# Patient Record
Sex: FEMALE | Race: WHITE | NOT HISPANIC OR LATINO | Employment: OTHER | URBAN - METROPOLITAN AREA
[De-identification: names, ages, dates, MRNs, and addresses within clinical notes are randomized per-mention and may not be internally consistent; named-entity substitution may affect disease eponyms.]

---

## 2017-01-18 ENCOUNTER — CARE COORDINATION (OUTPATIENT)
Dept: CARE COORDINATION | Facility: CLINIC | Age: 72
End: 2017-01-18

## 2017-01-18 NOTE — PROGRESS NOTES
Clinic Care Coordination Contact  [facility]/Voicemail    Referral Source: PCP  Clinical Data: Care Coordinator Outreach  Outreach attempted x 1.  Unable to leave pt a voicemail message due to not having a voicemail box set-up.     Plan:  Care Coordinator will try to reach patient again in 10-14 business days.    MICHELLE Lowry, [facility]  Social Work - Care Coordinator  [facility]- [place] and [place]  Phone: [phone]

## 2017-02-03 ENCOUNTER — CARE COORDINATION (OUTPATIENT)
Dept: CARE COORDINATION | Facility: CLINIC | Age: 72
End: 2017-02-03

## 2017-02-03 NOTE — PROGRESS NOTES
Clinic Care Coordination Contact  [facility]/Voicemail    Referral Source: PCP  Clinical Data: Care Coordinator Outreach  Outreach attempted x 2.  SW was not able to leave pt a voicemail message due to her voicemail box not being set up. SW attempted to reach pt at a later time in the afternoon without luck.    Plan: Care Coordinator will try to reach patient again in one month.    MICHELLE Lowry, SW  Social Work - Care Coordinator  [facility]- [place], [place], and [place]  Phone: [phone]

## 2017-03-03 ENCOUNTER — CARE COORDINATION (OUTPATIENT)
Dept: CARE COORDINATION | Facility: CLINIC | Age: 72
End: 2017-03-03

## 2017-03-03 NOTE — LETTER
[facility]      [address] [place] [address]        March 3, 2017      Mariah [place]  [address]   [facility] [address]    Dear Mariah,  I am the Clinic Care Coordinator that works with your primary care provider's clinic. I recently tried to call and was unable to reach you. Below is a description of what Clinic Care Coordination is and how I can further assist you.     The Clinic Care Coordinator role is a Registered Nurse and/or  who understands the health care system. The goal of Clinic Care Coordination is to help you manage your health and improve access to the [facility] in the most efficient manner.  The Registered Nurse can assist you in meeting your health care goals by providing education, coordinating services, and strengthening the communication among your providers. The  can assist you with financial, behavioral, psychosocial, and chemical dependency and counseling/psychiatric resources.    Please feel free to keep this letter and contact information to contact me at [phone] with any further questions or concerns that may arise. We at [facility] are focused on providing you with the highest-quality healthcare experience possible and that all starts with you.       Sincerely,     Colleen Chow

## 2017-03-03 NOTE — PROGRESS NOTES
Clinic Care Coordination Contact  [facility]/Voicemail    Referral Source: PCP  Clinical Data: Care Coordinator Outreach  Outreach attempted x 3.  Unable to leave a voicemail message as pt does not have it set-up.     Plan: Care Coordinator mailed out care coordination introduction letter on 03/03/17. Care Coordinator will try to reach patient again in one month.    MICHELLE Lowry, [facility]  Social Work - Care Coordinator  [facility]- [place], [place], and [place]  Phone: [phone]

## 2017-04-03 ENCOUNTER — CARE COORDINATION (OUTPATIENT)
Dept: CARE COORDINATION | Facility: CLINIC | Age: 72
End: 2017-04-03

## 2017-04-03 NOTE — PROGRESS NOTES
Clinic Care Coordination Contact  [facility]/Voicemail    Referral Source: PCP  Clinical Data: Care Coordinator Outreach  Outreach attempted x 4.  SW is unable to leave a voicemail message as pt's phone cut off before the options was available.    Plan: Care Coordinator mailed out care coordination introduction letter on 03/03/17. Care Coordinator will try to reach patient again in one month.    MICHELLE Lowry, LGSW  Social Work - Care Coordinator  [facility]- [place], [place], and [place]  Phone: [phone]

## 2017-05-03 ENCOUNTER — CARE COORDINATION (OUTPATIENT)
Dept: CARE COORDINATION | Facility: CLINIC | Age: 72
End: 2017-05-03

## 2017-05-03 NOTE — PROGRESS NOTES
Clinic Care Coordination Contact  [facility]/Voicemail    Referral Source: PCP  Clinical Data: Care Coordinator Outreach  Outreach attempted x 5.  SW was not able to leave a voicemail message for pt due to the voicemail box not being sent up.    Plan: Care Coordinator mailed out care coordination introduction letter on 03/03/17. Care Coordinator will try to reach patient again in one month.    MICHELLE Lowry, SW  Social Work - Care Coordinator  [facility]- [place], [place], and [place]  Phone: [phone]

## 2017-06-05 ENCOUNTER — CARE COORDINATION (OUTPATIENT)
Dept: CARE COORDINATION | Facility: CLINIC | Age: 72
End: 2017-06-05

## 2017-06-05 NOTE — PROGRESS NOTES
Clinic Care Coordination Contact  [email or web address]Voicemail    Referral Source: PCP  Clinical Data: Care Coordinator Outreach  Outreach attempted x 6.  SW was not able to leave a voicemail message as pt does not have a message set-up.    Plan: Care Coordinator mailed out care coordination introduction letter on 03/03/17. Care Coordinator will try to reach patient again in one month. Please alert SW if pt schedules an appointment.     MICHELLE Lowry, [facility]  Social Work - Care Coordinator  [facility]- [place], [place], and [place]  Phone: [phone]

## 2017-07-03 ENCOUNTER — CARE COORDINATION (OUTPATIENT)
Dept: CARE COORDINATION | Facility: CLINIC | Age: 72
End: 2017-07-03

## 2017-07-03 NOTE — PROGRESS NOTES
Clinic Care Coordination Contact  [facility]/Voicemail    Referral Source: PCP  Clinical Data: Care Coordinator Outreach  Outreach attempted x 7.  SW is not able to leave a voicemail message as pt does not have a voicemail box set-up.    Plan: Care Coordinator mailed out care coordination introduction letter on 03/03/17. Care Coordinator will do no further outreaches at this time. Pt has SW contact information for immediate concerns. SW left a note for staff to update SW when pt schedules an appointment with PCP.    MICHELLE Lowry, [facility]  Social Work - Care Coordinator  [facility]- [place], [place], and [place]  Phone: [phone]

## 2017-09-27 ENCOUNTER — DOCUMENTATION ONLY (OUTPATIENT)
Dept: FAMILY MEDICINE | Facility: CLINIC | Age: 72
End: 2017-09-27

## 2017-10-27 ENCOUNTER — TELEPHONE (OUTPATIENT)
Dept: FAMILY MEDICINE | Facility: CLINIC | Age: 72
End: 2017-10-27

## 2017-10-27 NOTE — TELEPHONE ENCOUNTER
I tried to call patient and no answer.  Could you please let sister know this?    Please forward to Colleen our care coordinator and we can discuss what social service options might be available to assist in this situation.  Unfortunately, if pt is not in danger it may be difficult to assist in this situation.    Thanks.    Rossi

## 2017-10-27 NOTE — TELEPHONE ENCOUNTER
Patient sister Letty is calling, concerned that the patient may have dementia.  She is very forgetful, feels she should be seen.   She has been unable to get her to come in to the clinic with her for anything at all.  She also has had some stomach issues lately and not been feeling well, like her heart is racing at times.  She just refuses any appointments. Can we possibly do anything to encourage  Her to come in? Could you call her?   She went off of her anti-depressant as well, and has not been seen   Here in over a year. Many care coordination phone calls where she did not answer.  And were unable to leave a message.  Is there anything they can do to help her?    No consent to communicate on file, sister is her emergency contact.  Dinora Heaton, RN  Triage Nurse

## 2017-10-27 NOTE — TELEPHONE ENCOUNTER
Called sister Letty back to let her know. She would like to talk to care coordination for some help with this.  Dinora Heaton, RN  Triage Nurse

## 2018-02-16 ENCOUNTER — TELEPHONE (OUTPATIENT)
Dept: FAMILY MEDICINE | Facility: CLINIC | Age: 73
End: 2018-02-16

## 2018-02-16 NOTE — TELEPHONE ENCOUNTER
Patient's sister Letty called stating alzheimers symptoms worse.     Patient is neglecting personal cares and pet cat. There are bags of garbage in the house. Patient will not go to see pcp.    Sister is requesting resources to help with this situation.    Message being routed to Colleen AKINS    Please contact sister Letty to discuss resources.    Thank you.    Anita Mcknight RN

## 2018-02-16 NOTE — TELEPHONE ENCOUNTER
SWS unable to discuss resources with sister without signed release. Sister informed and given alternate number per SWS.    Anita Mcknight RN

## 2018-03-02 ENCOUNTER — OFFICE VISIT (OUTPATIENT)
Dept: FAMILY MEDICINE | Facility: CLINIC | Age: 73
End: 2018-03-02
Payer: MEDICARE

## 2018-03-02 ENCOUNTER — CARE COORDINATION (OUTPATIENT)
Dept: CARE COORDINATION | Facility: CLINIC | Age: 73
End: 2018-03-02

## 2018-03-02 VITALS
BODY MASS INDEX: 15.73 KG/M2 | TEMPERATURE: 98.4 F | WEIGHT: 92.1 LBS | HEART RATE: 62 BPM | OXYGEN SATURATION: 100 % | SYSTOLIC BLOOD PRESSURE: 114 MMHG | DIASTOLIC BLOOD PRESSURE: 74 MMHG | RESPIRATION RATE: 18 BRPM | HEIGHT: 64 IN

## 2018-03-02 DIAGNOSIS — F32.5 MAJOR DEPRESSION IN COMPLETE REMISSION (H): ICD-10-CM

## 2018-03-02 DIAGNOSIS — F41.9 ANXIETY: Primary | ICD-10-CM

## 2018-03-02 DIAGNOSIS — R41.3 MEMORY LOSS: ICD-10-CM

## 2018-03-02 DIAGNOSIS — E46 PROTEIN-CALORIE MALNUTRITION, UNSPECIFIED SEVERITY (H): ICD-10-CM

## 2018-03-02 PROCEDURE — 99214 OFFICE O/P EST MOD 30 MIN: CPT | Performed by: NURSE PRACTITIONER

## 2018-03-02 RX ORDER — FLUOXETINE 10 MG/1
10 CAPSULE ORAL DAILY
Qty: 30 CAPSULE | Refills: 0 | Status: SHIPPED | OUTPATIENT
Start: 2018-03-02 | End: 2018-03-09

## 2018-03-02 ASSESSMENT — ANXIETY QUESTIONNAIRES
3. WORRYING TOO MUCH ABOUT DIFFERENT THINGS: SEVERAL DAYS
1. FEELING NERVOUS, ANXIOUS, OR ON EDGE: NOT AT ALL
7. FEELING AFRAID AS IF SOMETHING AWFUL MIGHT HAPPEN: SEVERAL DAYS
5. BEING SO RESTLESS THAT IT IS HARD TO SIT STILL: NOT AT ALL
6. BECOMING EASILY ANNOYED OR IRRITABLE: SEVERAL DAYS
2. NOT BEING ABLE TO STOP OR CONTROL WORRYING: SEVERAL DAYS
GAD7 TOTAL SCORE: 4

## 2018-03-02 ASSESSMENT — PATIENT HEALTH QUESTIONNAIRE - PHQ9: 5. POOR APPETITE OR OVEREATING: NOT AT ALL

## 2018-03-02 NOTE — MR AVS SNAPSHOT
After Visit Summary   3/2/2018    Mariah [place]    MRN: [id number]           Patient Information     Date Of Birth          1945        Visit Information        Provider Department      3/2/2018 3:00 PM Agnes Montejo APRN CNP [facility]        Today's Diagnoses     Anxiety    -  1    Memory loss        Protein-calorie malnutrition, unspecified severity (H)        Major depression in complete remission (H)          Care Instructions    Meet with care coordinator, then blood work.     Begin use of fluoxetine one pill per day in the morning.     Return to clinic in 1 week for physical.          Follow-ups after your visit        Follow-up notes from your care team     Return in about 1 week (around 3/9/2018).      Your next 10 appointments already scheduled     Mar 09, 2018  3:00 PM CST   PHYSICAL with DASIA Mccurdy CNP   [facility] ([facility]    [address] [address] [phone]              Who to contact     If you have questions or need follow up information about today's clinic visit or your schedule please contact [facility] directly at [phone].  Normal or non-critical lab and imaging results will be communicated to you by MyChart, letter or phone within 4 business days after the clinic has received the results. If you do not hear from us within 7 days, please contact the clinic through [employer] or phone. If you have a critical or abnormal lab result, we will notify you by phone as soon as possible.  Submit refill requests through [employer] or call your pharmacy and they will forward the refill request to us. Please allow 3 business days for your refill to be completed.          Additional Information About Your Visit        MyChart Information     [employer] lets you send messages to your doctor, view your test results, renew your prescriptions, schedule appointments and more. To sign  "up, go to [email or web address] . Click on \"Log in\" on the left side of the screen, which will take you to the Welcome page. Then click on \"Sign up Now\" on the right side of the page.     You will be asked to enter the access code listed below, as well as some personal information. Please follow the directions to create your username and password.     Your access code is: [email or web address]  Expires: 2018  7:39 PM     Your access code will  in 90 days. If you need help or a new code, please call your [place] clinic or [phone].        Care EveryWhere ID     This is your Care EveryWhere ID. This could be used by other organizations to access your [place] medical records  [id number]        Your Vitals Were     Pulse Temperature Respirations Height Pulse Oximetry BMI (Body Mass Index)    62 98.4  F (36.9  C) (Oral) 18 5' 3.75\" (1.619 m) 100% 15.93 kg/m2       Blood Pressure from Last 3 Encounters:   18 114/74   10/28/16 115/70   16 96/62    Weight from Last 3 Encounters:   18 92 lb 1.6 oz (41.8 kg)   10/28/16 98 lb 14.4 oz (44.9 kg)   16 102 lb 3.2 oz (46.4 kg)                 Today's Medication Changes          These changes are accurate as of 3/2/18 11:59 PM.  If you have any questions, ask your nurse or doctor.               Start taking these medicines.        Dose/Directions    FLUoxetine 10 MG capsule   Commonly known as:  PROzac   Used for:  Anxiety   Started by:  Agnes Montejo APRN CNP        Dose:  10 mg   Take 1 capsule (10 mg) by mouth daily   Quantity:  30 capsule   Refills:  0            Where to get your medicines      These medications were sent to [facility] - [address]  [address] [address]     Phone:  [phone]     FLUoxetine 10 MG capsule                Primary Care Provider Office Phone # Fax #    DASIA Mccurdy [phone]       [facility] [address] [facility] [address]        Equal Access to Services     [facility] VANESSA : Hadii alfredo huynh hadceejanina Soingrisali, waaxda luqadaha, qaybta kaalmamalinda guerra, yessenia anguianomayitobenny regalado. So [facility] [phone].    ATENCIÓN: Si habla español, tiene a carcamo disposición servicios gratuitos de asistencia lingüística. Llame al [phone].    We comply with applicable federal civil rights laws and [place] laws. We do not discriminate on the basis of race, color, national origin, age, disability, sex, sexual orientation, or gender identity.            Thank you!     Thank you for choosing [facility]  for your care. Our goal is always to provide you with excellent care. Hearing back from our patients is one way we can continue to improve our services. Please take a few minutes to complete the written survey that you may receive in the mail after your visit with us. Thank you!             Your Updated Medication List - Protect others around you: Learn how to safely use, store and throw away your medicines at www.disposemymeds.org.          This list is accurate as of 3/2/18 11:59 PM.  Always use your most recent med list.                   Brand Name Dispense Instructions for use Diagnosis    FLUoxetine 10 MG capsule    PROzac    30 capsule    Take 1 capsule (10 mg) by mouth daily    Anxiety       levalbuterol 45 MCG/ACT Inhaler    XOPENEX HFA    1 Inhaler    Inhale 2 puffs into the lungs every 6 hours as needed for shortness of breath / dyspnea or wheezing    Wheezing

## 2018-03-02 NOTE — PROGRESS NOTES
"Clinic Care Coordination Contact      Referral Source: PCP  Clinical Data: Care Coordinator Outreach  Spoke to PCP who stated that pt's sister (Carlos: prefers to be called Shawna) presented to the clinic visit with pt. PCP stated that Shawna would to look into options for memory care. SW went to the room that pt and Shawna were in at the [facility]. Both had their coats on stating that they needed to leave. SW stated that pt needs to stop at the lab and get her blood drawn. Shawna stated that she needed to feed pt and that they could not do lab work \"she is too weak to do it today\". Shawna stated that they would come back next to complete the lab work. SW stated that pt should have the lab work completed prior to her visit on 03/09/18. [place] stated that she did not have time to discuss concerns with SW \"our ride is going to leave us - we have to go\". SW asked if pt could sign a consent to communicate form. Shawna stated that they \"would do it next week\". SW explained that the form would allow SW to talk to her about pt's needs. Shawna stated that they \"would just do it next week\".SW updated PCP on the visit with pt and Shawna.    Plan: Care Coordinator mailed out care coordination introduction letter on 03/03/17 (almost a year ago). SW had been following pt in the past for needs such as transportation. Care Coordinator will meet with pt at her next clinic visit.    MICHELLE Lowry, [facility]  Social Work - Care Coordinator  [facility] [place], [place], and [place]  Phone: [phone]      "

## 2018-03-02 NOTE — PATIENT INSTRUCTIONS
Meet with care coordinator, then blood work.     Begin use of fluoxetine one pill per day in the morning.     Return to clinic in 1 week for physical.

## 2018-03-02 NOTE — PROGRESS NOTES
SUBJECTIVE:   Mariah [place] is a 72 year old female who presents to clinic today for the following health issues:    Evaluation for Alzheimer's:   Patient's sister states she has been forgetting things like dates, time, and money. Reports Mariah stopped taking Paxil 6 months ago and has been more forgetful since; would like her to restart use of Paxil. Adds Mariah is living in an apartment building on her own. She is concerned about Mariah's nutrition as she does all of her cooking and grocery shopping on her own. Also notes Mariah used to love to watch certain shows on TV, read, write poetry, and sketch, but no longer enjoys any of the above.    Patient unable to draw a clock sufficiently to tell time, unable to remember 3 items on mini cog.  Patient and her sister here are anxious about any issues related to a change in her housing. They are not ready to do many changes at this time.  Not certain about having lab draw today.  Asked if we could have a consult with care coordination, and pt and her sister agreed, but were not able to complete conversation with care coordinator as they were anxious.    Tobacco  Allergies  Meds  Med Hx  Surg Hx  Fam Hx  Soc Hx            Patient Active Problem List   Diagnosis     COPD (chronic obstructive pulmonary disease) (H)     Hyperlipidemia LDL goal <130     Major depression in complete remission (H)     Tooth loss     Protein-calorie malnutrition (H)     Past Surgical History:   Procedure Laterality Date     APPENDECTOMY  1982     OOPHORECTOMY Left 1982       Social History   Substance Use Topics     Smoking status: Current Every Day Smoker     Packs/day: 0.50     Years: 35.00     Smokeless tobacco: Never Used     Alcohol use Yes      Comment: 2 times per week, 2 beers per time     Family History   Problem Relation Age of Onset     DIABETES Maternal Aunt      Hypertension Sister      Myocardial Infarction Maternal Uncle      Myocardial Infarction Paternal Uncle       "HEART DISEASE Sister 62     Stent placed           ROS:  CONSTITUTIONAL: NEGATIVE for fever, chills, remains with low body weight which has been discussed multiple times--  PSYCHIATRIC: see HPI   Resp:  No sob  CV: no chest pain  GI: no bowel or bladder changes  Neuro:  No recent headache or head injury    This document serves as a record of the services and decisions personally performed and made by DASIA Mccurdy CNP. It was created on his/her behalf by Juan José Velez, a trained medical scribe. The creation of this document is based the provider's statements to the medical scribe.  Scribe Juan José Velez 3:19 PM, March 2, 2018  OBJECTIVE:                                                    /74 (BP Location: Right arm, Patient Position: Chair, Cuff Size: Adult Regular)  Pulse 62  Temp 98.4  F (36.9  C) (Oral)  Resp 18  Ht 5' 3.75\" (1.619 m)  Wt 92 lb 1.6 oz (41.8 kg)  SpO2 100%  BMI 15.93 kg/m2  Body mass index is 15.93 kg/(m^2).  GENERAL: appears anxious,  Poor eye contact.    PSYCH: mentation see screening for dementia.  Admits to symptoms of depression with withdrawal from previously pleasurable activities.     ASSESSMENT/PLAN:                                                    A/P:    ICD-10-CM    1. Anxiety F41.9 FLUoxetine (PROZAC) 10 MG capsule   2. Memory loss R41.3 Comprehensive metabolic panel     TSH with free T4 reflex     *UA reflex to Microscopic     CBC with platelets differential     Erythrocyte sedimentation rate auto     CANCELED: Comprehensive metabolic panel     CANCELED: TSH with free T4 reflex     CANCELED: *UA reflex to Microscopic     CANCELED: CBC with platelets differential     CANCELED: Erythrocyte sedimentation rate auto   3. Protein-calorie malnutrition, unspecified severity (H) E46    4. Major depression in complete remission (H) F32.5      Patient did very well on Paxil in the past, but I am concerned about her ability to take this consistently. Rx fluoxetine with " longer half life. Get labs today.  Has appointment for next week and can continue discussion about dementia and her needs for support.  Today have pt meet with care coordination. Met with care coordinator, but patient and her sister are suspicious of care coordination. Patient left clinic today before completing lab work.     Will continue to follow closely and work with family to consider how best to meet patients' needs.  Total visit time 30; over one half spent discussing current issues.      Patient Instructions   Meet with care coordinator, then blood work.     Begin use of fluoxetine one pill per day in the morning.     Return to clinic in 1 week for physical.    The information in this document, created by the medical scribe for me, accurately reflects the services I personally performed and the decisions made by me. I have reviewed and approved this document for accuracy prior to leaving the patient care area.  3:38 PM, 03/02/18    DASIA Mccurdy [facility]

## 2018-03-03 ASSESSMENT — ANXIETY QUESTIONNAIRES: GAD7 TOTAL SCORE: 4

## 2018-03-03 ASSESSMENT — PATIENT HEALTH QUESTIONNAIRE - PHQ9: SUM OF ALL RESPONSES TO PHQ QUESTIONS 1-9: 11

## 2018-03-04 PROBLEM — E46 PROTEIN-CALORIE MALNUTRITION (H): Status: ACTIVE | Noted: 2018-03-04

## 2018-03-06 PROBLEM — F41.9 ANXIETY: Status: ACTIVE | Noted: 2018-03-06

## 2018-03-09 ENCOUNTER — CARE COORDINATION (OUTPATIENT)
Dept: CARE COORDINATION | Facility: CLINIC | Age: 73
End: 2018-03-09

## 2018-03-09 ENCOUNTER — OFFICE VISIT (OUTPATIENT)
Dept: FAMILY MEDICINE | Facility: CLINIC | Age: 73
End: 2018-03-09
Payer: MEDICARE

## 2018-03-09 VITALS
SYSTOLIC BLOOD PRESSURE: 134 MMHG | HEART RATE: 69 BPM | DIASTOLIC BLOOD PRESSURE: 72 MMHG | TEMPERATURE: 98.3 F | WEIGHT: 95.9 LBS | HEIGHT: 64 IN | OXYGEN SATURATION: 100 % | BODY MASS INDEX: 16.37 KG/M2 | RESPIRATION RATE: 18 BRPM

## 2018-03-09 DIAGNOSIS — F41.9 ANXIETY: ICD-10-CM

## 2018-03-09 DIAGNOSIS — R41.3 MEMORY LOSS: ICD-10-CM

## 2018-03-09 DIAGNOSIS — R82.90 NONSPECIFIC FINDING ON EXAMINATION OF URINE: Primary | ICD-10-CM

## 2018-03-09 LAB
ALBUMIN UR-MCNC: NEGATIVE MG/DL
AMORPH CRY #/AREA URNS HPF: ABNORMAL /HPF
APPEARANCE UR: ABNORMAL
BACTERIA #/AREA URNS HPF: ABNORMAL /HPF
BASOPHILS # BLD AUTO: 0 10E9/L (ref 0–0.2)
BASOPHILS NFR BLD AUTO: 0.1 %
BILIRUB UR QL STRIP: NEGATIVE
COLOR UR AUTO: YELLOW
DIFFERENTIAL METHOD BLD: ABNORMAL
EOSINOPHIL # BLD AUTO: 0.1 10E9/L (ref 0–0.7)
EOSINOPHIL NFR BLD AUTO: 0.6 %
ERYTHROCYTE [DISTWIDTH] IN BLOOD BY AUTOMATED COUNT: 14 % (ref 10–15)
ERYTHROCYTE [SEDIMENTATION RATE] IN BLOOD BY WESTERGREN METHOD: 13 MM/H (ref 0–30)
GLUCOSE UR STRIP-MCNC: NEGATIVE MG/DL
HCT VFR BLD AUTO: 34.5 % (ref 35–47)
HGB BLD-MCNC: 11.6 G/DL (ref 11.7–15.7)
HGB UR QL STRIP: NEGATIVE
KETONES UR STRIP-MCNC: NEGATIVE MG/DL
LEUKOCYTE ESTERASE UR QL STRIP: ABNORMAL
LYMPHOCYTES # BLD AUTO: 2 10E9/L (ref 0.8–5.3)
LYMPHOCYTES NFR BLD AUTO: 25 %
MCH RBC QN AUTO: 33.7 PG (ref 26.5–33)
MCHC RBC AUTO-ENTMCNC: 33.6 G/DL (ref 31.5–36.5)
MCV RBC AUTO: 100 FL (ref 78–100)
MONOCYTES # BLD AUTO: 0.7 10E9/L (ref 0–1.3)
MONOCYTES NFR BLD AUTO: 8.5 %
MUCOUS THREADS #/AREA URNS LPF: PRESENT /LPF
NEUTROPHILS # BLD AUTO: 5.4 10E9/L (ref 1.6–8.3)
NEUTROPHILS NFR BLD AUTO: 65.8 %
NITRATE UR QL: NEGATIVE
NON-SQ EPI CELLS #/AREA URNS LPF: ABNORMAL /LPF
PH UR STRIP: 8.5 PH (ref 5–7)
PLATELET # BLD AUTO: 284 10E9/L (ref 150–450)
RBC # BLD AUTO: 3.44 10E12/L (ref 3.8–5.2)
RBC #/AREA URNS AUTO: ABNORMAL /HPF
SOURCE: ABNORMAL
SP GR UR STRIP: 1.01 (ref 1–1.03)
UROBILINOGEN UR STRIP-ACNC: 0.2 EU/DL (ref 0.2–1)
WBC # BLD AUTO: 8.2 10E9/L (ref 4–11)
WBC #/AREA URNS AUTO: ABNORMAL /HPF

## 2018-03-09 PROCEDURE — 99214 OFFICE O/P EST MOD 30 MIN: CPT | Performed by: NURSE PRACTITIONER

## 2018-03-09 PROCEDURE — 85025 COMPLETE CBC W/AUTO DIFF WBC: CPT | Performed by: NURSE PRACTITIONER

## 2018-03-09 PROCEDURE — 87086 URINE CULTURE/COLONY COUNT: CPT | Performed by: NURSE PRACTITIONER

## 2018-03-09 PROCEDURE — 80053 COMPREHEN METABOLIC PANEL: CPT | Performed by: NURSE PRACTITIONER

## 2018-03-09 PROCEDURE — 84443 ASSAY THYROID STIM HORMONE: CPT | Performed by: NURSE PRACTITIONER

## 2018-03-09 PROCEDURE — 36415 COLL VENOUS BLD VENIPUNCTURE: CPT | Performed by: NURSE PRACTITIONER

## 2018-03-09 PROCEDURE — 85652 RBC SED RATE AUTOMATED: CPT | Performed by: NURSE PRACTITIONER

## 2018-03-09 PROCEDURE — 81001 URINALYSIS AUTO W/SCOPE: CPT | Performed by: NURSE PRACTITIONER

## 2018-03-09 RX ORDER — PAROXETINE HYDROCHLORIDE HEMIHYDRATE 12.5 MG/1
12.5 TABLET, FILM COATED, EXTENDED RELEASE ORAL EVERY MORNING
Qty: 30 TABLET | Refills: 1 | Status: SHIPPED | OUTPATIENT
Start: 2018-03-09 | End: 2018-11-05

## 2018-03-09 ASSESSMENT — ACTIVITIES OF DAILY LIVING (ADL)
I_NEED_ASSISTANCE_FOR_THE_FOLLOWING_DAILY_ACTIVITIES:: LAUNDRY
CURRENT_FUNCTION: BATHING REQUIRES ASSISTANCE
CURRENT_FUNCTION: TELEPHONE REQUIRES ASSISTANCE
I_NEED_ASSISTANCE_FOR_THE_FOLLOWING_DAILY_ACTIVITIES:: MEDICATION ADMINISTRATION
CURRENT_FUNCTION: LAUNDRY REQUIRES ASSISTANCE
CURRENT_FUNCTION: MEDICATION ADMINISTRATION REQUIRES ASSISTANCE
CURRENT_FUNCTION: MONEY MANAGEMENT REQUIRES ASSISTANCE
CURRENT_FUNCTION: TRANSPORTATION REQUIRES ASSISTANCE
CURRENT_FUNCTION: SHOPPING REQUIRES ASSISTANCE
I_NEED_ASSISTANCE_FOR_THE_FOLLOWING_DAILY_ACTIVITIES:: TRANSPORTATION
CURRENT_FUNCTION: HOUSEWORK REQUIRES ASSISTANCE
I_NEED_ASSISTANCE_FOR_THE_FOLLOWING_DAILY_ACTIVITIES:: BATHING
I_NEED_ASSISTANCE_FOR_THE_FOLLOWING_DAILY_ACTIVITIES:: TELEPHONE USE
I_NEED_ASSISTANCE_FOR_THE_FOLLOWING_DAILY_ACTIVITIES:: MONEY MANAGEMENT
I_NEED_ASSISTANCE_FOR_THE_FOLLOWING_DAILY_ACTIVITIES:: PREPARING MEALS
I_NEED_ASSISTANCE_FOR_THE_FOLLOWING_DAILY_ACTIVITIES:: SHOPPING
I_NEED_ASSISTANCE_FOR_THE_FOLLOWING_DAILY_ACTIVITIES:: HOUSEWORK
CURRENT_FUNCTION: PREPARING MEALS REQUIRES ASSISTANCE

## 2018-03-09 NOTE — MR AVS SNAPSHOT
"              After Visit Summary   3/9/2018    Mariah Winslow    MRN: [id number]           Patient Information     Date Of Birth          1945        Visit Information        Provider Department      3/9/2018 3:00 PM Agnes Montejo APRN CNP [facility]        Today's Diagnoses     Nonspecific finding on examination of urine    -  1    Memory loss        Anxiety          Care Instructions    Begin use of iron supplements.     Begin use of Paxil.           Follow-ups after your visit        Follow-up notes from your care team     Return in about 3 weeks (around 3/30/2018).      Who to contact     If you have questions or need follow up information about today's clinic visit or your schedule please contact [facility] directly at [phone].  Normal or non-critical lab and imaging results will be communicated to you by Wandrianhart, letter or phone within 4 business days after the clinic has received the results. If you do not hear from us within 7 days, please contact the clinic through Wandrianhart or phone. If you have a critical or abnormal lab result, we will notify you by phone as soon as possible.  Submit refill requests through Fieldoo or call your pharmacy and they will forward the refill request to us. Please allow 3 business days for your refill to be completed.          Additional Information About Your Visit        WandrianharTickPick Information     Fieldoo lets you send messages to your doctor, view your test results, renew your prescriptions, schedule appointments and more. To sign up, go to [email or web address] . Click on \"Log in\" on the left side of the screen, which will take you to the Welcome page. Then click on \"Sign up Now\" on the right side of the page.     You will be asked to enter the access code listed below, as well as some personal information. Please follow the directions to create your username and password.     Your access code is: [email or web address]  Expires: " "2018  7:39 PM     Your access code will  in 90 days. If you need help or a new code, please call your [facility] clinic or [phone].        Care EveryWhere ID     This is your Care EveryWhere ID. This could be used by other organizations to access your [facility] medical records  [id number]        Your Vitals Were     Pulse Temperature Respirations Height Pulse Oximetry BMI (Body Mass Index)    69 98.3  F (36.8  C) 18 5' 3.75\" (1.619 m) 100% 16.59 kg/m2       Blood Pressure from Last 3 Encounters:   18 134/72   18 114/74   10/28/16 115/70    Weight from Last 3 Encounters:   18 95 lb 14.4 oz (43.5 kg)   18 92 lb 1.6 oz (41.8 kg)   10/28/16 98 lb 14.4 oz (44.9 kg)              We Performed the Following     *UA reflex to Microscopic     CBC with platelets differential     Comprehensive metabolic panel     Erythrocyte sedimentation rate auto     TSH with free T4 reflex     Urine Culture Aerobic Bacterial          Today's Medication Changes          These changes are accurate as of 3/9/18  3:39 PM.  If you have any questions, ask your nurse or doctor.               Start taking these medicines.        Dose/Directions    PARoxetine 12.5 MG 24 hr tablet   Commonly known as:  PAXIL-CR   Used for:  Anxiety   Started by:  Agnes Montejo APRN CNP        Dose:  12.5 mg   Take 1 tablet (12.5 mg) by mouth every morning   Quantity:  30 tablet   Refills:  1         Stop taking these medicines if you haven't already. Please contact your care team if you have questions.     FLUoxetine 10 MG capsule   Commonly known as:  PROzac   Stopped by:  Agnes Montejo APRN CNP                Where to get your medicines      These medications were sent to [facility] [place] - [address]  [address] [address] [address]     Phone:  [phone]     PARoxetine 12.5 MG 24 hr tablet                Primary Care Provider Office Phone # Fax #    DASIA Mccurdy CNP " [phone]       [facility] [address]  [facility] [address]        Equal Access to Services     BERNARDO MENDEZ : Hadii alfredo huynh hadceeo Soingrisali, waaxda luqadaha, qaybta kaalmada adeprernada, yessenia andersongerald fabricio. So [facility] [place] [phone].    ATENCIÓN: Si habla español, tiene a carcamo disposición servicios gratuitos de asistencia lingüística. Llame al [phone].    We comply with applicable federal civil rights laws and [place] laws. We do not discriminate on the basis of race, color, national origin, age, disability, sex, sexual orientation, or gender identity.            Thank you!     Thank you for choosing [facility]  for your care. Our goal is always to provide you with excellent care. Hearing back from our patients is one way we can continue to improve our services. Please take a few minutes to complete the written survey that you may receive in the mail after your visit with us. Thank you!             Your Updated Medication List - Protect others around you: Learn how to safely use, store and throw away your medicines at www.disposemymeds.org.          This list is accurate as of 3/9/18  3:39 PM.  Always use your most recent med list.                   Brand Name Dispense Instructions for use Diagnosis    levalbuterol 45 MCG/ACT Inhaler    XOPENEX HFA    1 Inhaler    Inhale 2 puffs into the lungs every 6 hours as needed for shortness of breath / dyspnea or wheezing    Wheezing       PARoxetine 12.5 MG 24 hr tablet    PAXIL-CR    30 tablet    Take 1 tablet (12.5 mg) by mouth every morning    Anxiety

## 2018-03-09 NOTE — PROGRESS NOTES
"   SUBJECTIVE:   CC: Mariah Winslow is an 72 year old woman who presents for preventive health visit.     Physical   Annual:     Getting at least 3 servings of Calcium per day::  NO    Bi-annual eye exam::  NO    Dental care twice a year::  NO    Sleep apnea or symptoms of sleep apnea::  None    Taking medications regularly::  No    Barriers to taking medications::  Problems remembering to take them    Ability to successfully perform activities of daily living: telephone requires assistance, transportation requires assistance, shopping requires assistance, preparing meals requires assistance, housework requires assistance, bathing requires assistance, laundry requires assistance, medication administration requires assistance and money management requires assistance  Home Safety:  No safety concerns identified      Patient's eye contact was poor. Patient was a poor historian. Her sister is here and is answering most of the questions for the patient. Patient got up in the middle of appointment and said it was time to go. Finally did agree to lung and heart exam. Was able to palpate abdomen and noticed no tenderness.    PCA: Sister would like to have patient qualified for PCA. Patient's niece is a PCA and someone Mariah would trust. Would be able to do grocery shopping and cleaning for her, which sister notes is necessary.     Left Sided Pain: Patient describes pain in her left side for the past couple months. When asked to characterize, she states \"it just hurts.\" Pain is not present in legs or arms, unclear where it is located. Some relief with Rolaids.     Anxiety: Patient's sister states the two of them did not want her to take Prozac and would like her to go back on Paxil.       Review of labs:  Not all labs back today but was able to review CBC and anemia.      Today's PHQ-2 Score:   PHQ-2 ( 1999 Pfizer) 3/9/2018   PHQ-2 Score Incomplete     Abuse: Current or Past(Physical, Sexual or Emotional)- No  Do you feel " "safe in your environment - Yes    Social History   Substance Use Topics     Smoking status: Current Every Day Smoker     Packs/day: 0.50     Years: 35.00     Smokeless tobacco: Never Used     Alcohol use Yes      Comment: 2 times per week, 2 beers per time     No flowsheet data found.    Reviewed orders with patient.  Reviewed health maintenance and updated orders accordingly - Yes  Labs reviewed in EPIC    Pertinent mammograms are reviewed under the imaging tab.    Tobacco  Allergies  Meds  Problems  Med Hx  Surg Hx  Fam Hx  Soc Hx        Allergies  Meds  Problems        Review of Systems  ROS:  Note that patient is a poor historian.  Last week's visit noted significant evidence for dementia.      CONSTITUTIONAL: NEGATIVE for fever, chills  EYES: NEGATIVE for vision changes   RESP: NEGATIVE for significant cough or SOB  : NEGATIVE for frequency, dysuria, or hematuria  MUSCULOSKELETAL: NEGATIVE for significant arthralgias        This document serves as a record of the services and decisions personally performed and made by DASIA Mccurdy CNP. It was created on his/her behalf by Juan José Velez, a trained medical scribe. The creation of this document is based the provider's statements to the medical scribe.  Scribaileen Velez 3:23 PM, March 9, 2018  OBJECTIVE:   /72 (BP Location: Right arm, Patient Position: Chair, Cuff Size: Adult Regular)  Pulse 69  Temp 98.3  F (36.8  C)  Resp 18  Ht 5' 3.75\" (1.619 m)  Wt 95 lb 14.4 oz (43.5 kg)  SpO2 100%  BMI 16.59 kg/m2  Physical Exam  GENERAL: healthy, alert and no distress  RESP: lungs clear to auscultation - no rales, rhonchi or wheezes  CV: regular rate and rhythm, normal S1 S2, no S3 or S4, no murmur, click or rub, no peripheral edema and peripheral pulses strong  ABDOMEN: soft, nontender, no hepatosplenomegaly, no masses    ASSESSMENT/PLAN:       ICD-10-CM    1. Nonspecific finding on examination of urine R82.90 Urine Culture Aerobic " "Bacterial     Urine Microscopic   2. Memory loss R41.3 Comprehensive metabolic panel     TSH with free T4 reflex     *UA reflex to Microscopic     CBC with platelets differential     Erythrocyte sedimentation rate auto   3. Anxiety F41.9 PARoxetine (PAXIL-CR) 12.5 MG 24 hr tablet     Patient agrees to start a multivitamin.  Patient agrees to take paxil.  Was afraid to start the fluoxetine.  Tried to Rx for long acting paroxetine but cost was issue.    Patient to follow up in 3 weeks.    Care coordination is on this case and is working on home evaluation at this time.    COUNSELING:      Mariah reports that she has been smoking.  She has a 17.50 pack-year smoking history. She has never used smokeless tobacco.  Estimated body mass index is 16.59 kg/(m^2) as calculated from the following:    Height as of this encounter: 5' 3.75\" (1.619 m).    Weight as of this encounter: 95 lb 14.4 oz (43.5 kg).   Weight management plan refer for home evaluation to care coordination + multivitamin    Counseling Resources:  ATP IV Guidelines  Pooled Cohorts Equation Calculator  Breast Cancer Risk Calculator  FRAX Risk Assessment  ICSI Preventive Guidelines  Dietary Guidelines for Americans, 2010  USDA's MyPlate  ASA Prophylaxis  Lung CA Screening    The information in this document, created by the medical scribe for me, accurately reflects the services I personally performed and the decisions made by me. I have reviewed and approved this document for accuracy prior to leaving the patient care area.  3:52 PM, 03/09/18    DASIA Mccurdy [facility] [place]  Answers for HPI/ROS submitted by the patient on 3/9/2018   PHQ-2 Score: Incomplete    "

## 2018-03-09 NOTE — PROGRESS NOTES
Clinic Care Coordination Contact  Voicemail    Referral Source: PCP  Clinical Data: Care Coordinator Outreach  SW spoke to PCP who stated that pt is looking to get PCA services. PCP stated that pt and sister left after a short visit with her. SW left a message for [facility] asking that they call SW to help get an assessment scheduled for pt.     Plan: Care Coordinator will try to reach patient again in 2 weeks.    MICHELLE Lowry, [facility]  Social Work - Care Coordinator  [facility]- [place], [place], and [place]  Phone: [phone]

## 2018-03-10 LAB
ALBUMIN SERPL-MCNC: 3.9 G/DL (ref 3.4–5)
ALP SERPL-CCNC: 62 U/L (ref 40–150)
ALT SERPL W P-5'-P-CCNC: 18 U/L (ref 0–50)
ANION GAP SERPL CALCULATED.3IONS-SCNC: 5 MMOL/L (ref 3–14)
AST SERPL W P-5'-P-CCNC: 25 U/L (ref 0–45)
BACTERIA SPEC CULT: NO GROWTH
BILIRUB SERPL-MCNC: 0.2 MG/DL (ref 0.2–1.3)
BUN SERPL-MCNC: 9 MG/DL (ref 7–30)
CALCIUM SERPL-MCNC: 8.7 MG/DL (ref 8.5–10.1)
CHLORIDE SERPL-SCNC: 100 MMOL/L (ref 94–109)
CO2 SERPL-SCNC: 31 MMOL/L (ref 20–32)
CREAT SERPL-MCNC: 0.6 MG/DL (ref 0.52–1.04)
GFR SERPL CREATININE-BSD FRML MDRD: >90 ML/MIN/1.7M2
GLUCOSE SERPL-MCNC: 80 MG/DL (ref 70–99)
POTASSIUM SERPL-SCNC: 4.2 MMOL/L (ref 3.4–5.3)
PROT SERPL-MCNC: 7.4 G/DL (ref 6.8–8.8)
SODIUM SERPL-SCNC: 136 MMOL/L (ref 133–144)
SPECIMEN SOURCE: NORMAL
TSH SERPL DL<=0.005 MIU/L-ACNC: 1.27 MU/L (ref 0.4–4)

## 2018-03-13 NOTE — PROGRESS NOTES
Clinic Care Coordination Contact      Referral Source: PCP  Clinical Data: Care Coordinator Outreach  Spoke to [facility] who stated that they would be happy to outreach to pt to start services. SW stated that the worker should call pt's sister (Shawna) to get more information about pt's situation and to set-up the assessment. Per worker, they will outreach to SW as needed.    Plan:  Care Coordinator will try to reach patient again in 1-2 weeks.    MICHELLE Lowry, [facility]  Social Work - Care Coordinator  [facility]- [place], [place], and [place]  Phone: [phone]

## 2018-03-22 ENCOUNTER — PATIENT OUTREACH (OUTPATIENT)
Dept: CARE COORDINATION | Facility: CLINIC | Age: 73
End: 2018-03-22

## 2018-03-22 NOTE — PROGRESS NOTES
Clinic Care Coordination Contact  [facility]/Voicemail    Referral Source: PCP  Clinical Data: Care Coordinator Outreach  Outreach attempted x 1.  Left message on voicemail for pt's sister (Shawna) with call back information and requested return call. SW had contacted [facility] asking for an assessment to determine if pt would be eligible for services.     Plan: Care Coordinator will try to reach patient again in 2 weeks.     MICHELLE Lowry, [facility]  Social Work - Care Coordinator  [facility]- [place], [place], and [place]  Phone: [phone]

## 2018-03-28 ENCOUNTER — TELEPHONE (OUTPATIENT)
Dept: FAMILY MEDICINE | Facility: CLINIC | Age: 73
End: 2018-03-28

## 2018-03-28 NOTE — TELEPHONE ENCOUNTER
Sister is calling to ask about patient. Agreed that she needs a PCA at home.   Patient is on medicare, and she is wondering how we arrange for this.   Got a call from Hillary recently from [facility].  They will be setting up a visit soon. Hopefully in April.    Shawna is wondering if they would contact [facility] about this.   Explained that the [facility] will [facility] us and [facility] can help with facilitating  Visits depending on what they find at her in home assessment.     Dinora Heaton, RN  Triage Nurse

## 2018-04-06 ENCOUNTER — PATIENT OUTREACH (OUTPATIENT)
Dept: CARE COORDINATION | Facility: CLINIC | Age: 73
End: 2018-04-06

## 2018-04-06 NOTE — PROGRESS NOTES
Clinic Care Coordination Contact  [facility]       Clinical Data: Care Coordinator Outreach  Outreach attempted x 2.  Left message on voicemail for pt's sister (Shawna) with call back information and requested return call. Per chart review, Shawna called on 03/28/18 stating that Hillary from [facility] called to get an evaluation arranged. Shawna stated to Triage RN that pt is needing PCA services.    Plan:  Care Coordinator will try to reach patient and/or sister at the beginning of May.    MICHELLE Lowry, [facility]  Social Work - Care Coordinator  [facility]- [place], [place], and [place]  Phone: [phone]

## 2018-04-10 NOTE — PROGRESS NOTES
Clinic Care Coordination Contact  Care Team Conversations    Message from Merly from [facility] who stated that she is needing a problem list and medication list for pt. Merly asked that PAUL fax them to [phone]. PAUL left Merly a message asking for a release of information form. Call received from Merly stating that she is not in the office today and will fax the release tomorrow. PAUL provided Merly with the fax number for the [facility] as SW will be there tomorrow (04/11/18).    MICHELLE Lowry, [facility]  Social Work - Care Coordinator  [facility], [place]  Phone: [phone]

## 2018-04-11 NOTE — PROGRESS NOTES
Clinic Care Coordination Contact  Care Team Conversations    SW spoke to Merly who stated that she fax the release this morning and agreed to re-send the MESFIN. SW received the MESFIN and sent the most recent PCP note, medication, and problem list included.     MICHELLE Lowry, [facility]  Social Work - Care Coordinator  [facility] [place], [place], and [place]  Phone: [phone]

## 2018-05-04 ENCOUNTER — PATIENT OUTREACH (OUTPATIENT)
Dept: CARE COORDINATION | Facility: CLINIC | Age: 73
End: 2018-05-04

## 2018-05-04 NOTE — PROGRESS NOTES
Clinic Care Coordination Contact  Care Team Conversations    PAUL left a message for Merly at [facility] asking for follow-up for pt and to determine what services that pt is currently open to. Return phone call from Dav at [facility] ([phone]) indicating that they have attempted to reach pt and pt's sister (Letty: Shawna) without luck and no return phone call. Dav stated that she will be issuing a 10-day notice. Pt has a MA application that needs verification by 06/10/18. Spoke to Dav who confirmed this information. PAUL attempted to reach pt (no voicemail set-up) and left a message for Shawna encouraging her to call Dav back (providing her with Dav's phone number).    MICHELLE Lowry, [facility]  Social Work - Care Coordinator  [facility]- [place], [place], and [place]  Phone: [phone]

## 2018-05-25 ENCOUNTER — PATIENT OUTREACH (OUTPATIENT)
Dept: CARE COORDINATION | Facility: CLINIC | Age: 73
End: 2018-05-25

## 2018-05-25 NOTE — PROGRESS NOTES
Clinic Care Coordination Contact  [facility]/Voicemail       Clinical Data: Care Coordinator Outreach  Left message on voicemail for Ali at [facility] with call back information and requested return call. Per last contact with Dav on 05/04/18, they were not going to open services to pt unless her or her sister (Letty: Shawna) returned their call. PAUL had attempted to reach pt and Shawna without luck.  PAUL spoke to Dav who stated that pt and/or Candy did not respond to her phone calls or letter thus pt was closed to [facility] services. Per Dav, pt would need to be re-assessed for future services.    Plan: Care Coordinator mailed out care coordination introduction letter on 05/25/18. Care Coordinator will try to reach patient again in 5-7 business days.    MICHELLE Lowry, [facility]  Social Work - Care Coordinator  [facility]- [place], [place], and [place]  Phone: [phone]

## 2018-05-25 NOTE — LETTER
[place] CARE COORDINATION  [address] [place] [address]        May 25, 2018    Mariah PRICILLA Winslow  [address] [address]      Dear Mariah,    I am a clinic care coordinator who works with DASIA Mccurdy CNP at [facility]. I recently tried to call and was unable to reach you. I wanted to introduce myself and provide you with my contact information so that you can call me with questions or concerns about your health care. Below is a description of clinic care coordination and how I can further assist you.     The clinic care coordinator is a registered nurse and/or  who understand the health care system. The goal of clinic care coordination is to help you manage your health and improve access to the [facility] in the most efficient manner. The registered nurse can assist you in meeting your health care goals by providing education, coordinating services, and strengthening the communication among your providers. The  can assist you with financial, behavioral, psychosocial, chemical dependency, counseling, and/or psychiatric resources.    Please feel free to contact me at [phone], with any questions or concerns. We at [facility] are focused on providing you with the highest-quality healthcare experience possible and that all starts with you.     Sincerely,     Colleen Chow

## 2018-06-01 ENCOUNTER — PATIENT OUTREACH (OUTPATIENT)
Dept: CARE COORDINATION | Facility: CLINIC | Age: 73
End: 2018-06-01

## 2018-06-01 NOTE — PROGRESS NOTES
Clinic Care Coordination Contact  [facility]/Voicemail       Clinical Data: Care Coordinator Outreach  Outreach attempted x 3.  Left message on voicemail for pt and pt's sister (Letty: Shawna) with call back information and requested return call. Pt did not respond to [facility] who was going to open pt for services in her home. Pt would need to contact [facility] for a re-assessment for future needs.     Plan: Care Coordinator mailed out care coordination introduction letter on 05/25/18. Care Coordinator will do no further outreaches at this time.    MICHELLE Lowry, [facility]  Social Work - Care Coordinator  [facility]- [place], [place], and [place]  Phone: [phone]

## 2018-06-11 ENCOUNTER — PATIENT OUTREACH (OUTPATIENT)
Dept: CARE COORDINATION | Facility: CLINIC | Age: 73
End: 2018-06-11

## 2018-06-11 NOTE — PROGRESS NOTES
Clinic Care Coordination Contact  [facility]/Voicemail       Clinical Data: Care Coordinator Outreach  Message from pt's sister (Letty: Shawna) stating that pt does not check her mail and Letty found the letter that SW mailed. Letty stated that she needs to be contacted for future needs and to call her back. Outreach attempted x 1.  Left message on voicemail for Letty with call back information and requested return call. SW stated in the message that she had left message for Letty, [facility] was calling Letty and pt, and that SW mailed the letter to pt as there was not an address for Letty.    Plan: Care Coordinator will do no further outreaches at this time. SW encouraged Letty to call back to the [facility] ([phone]) with future needs.    MICHELLE Lowry, [facility]  Social Work - Care Coordinator  [facility]- [place], [place], and [place]  Phone: [phone]

## 2018-06-29 ENCOUNTER — TELEPHONE (OUTPATIENT)
Dept: FAMILY MEDICINE | Facility: CLINIC | Age: 73
End: 2018-06-29

## 2018-07-03 ENCOUNTER — TELEPHONE (OUTPATIENT)
Dept: FAMILY MEDICINE | Facility: CLINIC | Age: 73
End: 2018-07-03

## 2018-07-03 NOTE — TELEPHONE ENCOUNTER
Panel Management Review      Patient has the following on her problem list:     Depression / Dysthymia review    Measure:  Needs PHQ-9 score of 4 or less during index window.  Administer PHQ-9 and if score is 5 or more, send encounter to provider for next steps.    5 - 7 month window range: 7/2/18-11/2/18    PHQ-9 SCORE 5/20/2016 10/28/2016 3/2/2018   Total Score - - -   Total Score 4 3 11       If PHQ-9 recheck is 5 or more, route to provider for next steps.    Patient is due for:  PHQ9      Composite cancer screening  Chart review shows that this patient is due/due soon for the following Mammogram and Fecal Colorectal (FIT)  Summary:    Patient is due/failing the following:   DAP, FOLLOW UP, FIT, MAMMOGRAM and PHQ9    Action needed:   Patient needs office visit for Follow up on Paxil. and Patient needs to do PHQ9.    Type of outreach:    Phone, left message for patient to call back.     Questions for provider review:    None                                                                                                                                    Yin PHILLIPS M.A.       Chart routed to Care Team .

## 2018-07-07 ENCOUNTER — TELEPHONE (OUTPATIENT)
Dept: FAMILY MEDICINE | Facility: CLINIC | Age: 73
End: 2018-07-07

## 2018-07-17 NOTE — TELEPHONE ENCOUNTER
7/17/2018    Attempt 3    Contacted patient in regards to scheduling VIP mammogram  Message     Patient is also due for - Preventive Health Screening Colonoscopy/FIT    Comments: CLINIC MADE PRIOR ATTEMPT      Outreach   AT

## 2018-07-26 ENCOUNTER — TELEPHONE (OUTPATIENT)
Dept: FAMILY MEDICINE | Facility: CLINIC | Age: 73
End: 2018-07-26

## 2018-07-26 DIAGNOSIS — F32.5 MAJOR DEPRESSION IN COMPLETE REMISSION (H): Primary | ICD-10-CM

## 2018-07-26 NOTE — TELEPHONE ENCOUNTER
Type of outreach:  Sent letter. and Sent PHQ9 also.  Health Maintenance Due   Topic Date Due     SPIROMETRY ONETIME  1945     HEPATITIS C SCREENING  10/16/1965     ADVANCE DIRECTIVE PLANNING Q5 YRS  10/16/2002     DEXA SCAN SCREENING (SYSTEM ASSIGNED)  10/16/2012     PNEUMOCOCCAL (2 of 2 - PCV13) 07/31/2014     COPD ACTION PLAN Q1 YR  04/25/2015     MAMMO SCREEN Q2 YR (SYSTEM ASSIGNED)  04/07/2017     FIT Q1 YR  09/28/2017     DEPRESSION ACTION PLAN Q1 YR  10/28/2017     Yin PHILLIPS M.A.

## 2018-07-26 NOTE — LETTER
July 26, 2018      Mariah PLEITEZ Goldy  [address]   [facility] [address]        Dear Nisa,      We care about your health and have reviewed your health plan including your medical conditions, medications, and lab results.  Based on this review, it is recommended that you follow up regarding the following health topic(s):  -Depression  -Breast Cancer Screening  -Colon Cancer Screening    We recommend you take the following action(s):  -schedule a MAMMOGRAM which is due. Please disregard this reminder if you have had this exam elsewhere within the last 1-2 years please let us know so we can update your records.  -schedule a COLONOSCOPY to look for colon cancer (due every 10 years or 5 years in higher risk situations.)  Colonoscopies can prevent 90-95% of colon cancer deaths.  Problem lesions can be removed before they ever become cancer.  If you do not wish to do a colonoscopy or cannot afford to do one at this time, there is another option called a Fecal Immunochemical Occult Blood Test (FIT) a take home stool sample kit.  It does not replace the colonoscopy for colorectal cancer screening, but it can detect hidden bleeding in the lower colon.  It does need to be repeated every year and if a positive result is obtained, you would be referred for a colonoscopy.  If you have completed either one of these tests at another facility, please have the records sent to our clinic for our records.  -Complete and return the attached PHQ-9 Form.  If your total score is greater than 9, please schedule a followup appointment.  If you answer Yes to question 9, call your clinic between the hours of 8 to 5.  You may also call the Suicide Hotline at 3-072-487-HTBB (1795) any time.     Please call us at the [facility] - [phone] (or use "[employer] to address the above recommendations.     Thank you for trusting [facility] and we appreciate the opportunity to serve you.  We look forward to supporting your  healthcare needs in the future.    Healthy Regards,    Your Health Care Team  [facility]

## 2018-08-01 ENCOUNTER — TELEPHONE (OUTPATIENT)
Dept: FAMILY MEDICINE | Facility: CLINIC | Age: 73
End: 2018-08-01

## 2018-08-01 NOTE — LETTER
August 16, 2018      Mariah Winslow  [address] [facility] [address] [place] [id number]        Dear Ms. Mariah Winslow,    We are contacting you to schedule an office visit with your primary care provider. Please call us at [phone] to schedule.     Sincerely,     Your [facility] Team

## 2018-08-01 NOTE — TELEPHONE ENCOUNTER
LMTC. PHQ9 due and patient also needs an appointment to follow up on the Paxil.  Yin PHILLIPS M.A.

## 2018-08-21 ENCOUNTER — TELEPHONE (OUTPATIENT)
Dept: FAMILY MEDICINE | Facility: CLINIC | Age: 73
End: 2018-08-21

## 2018-08-21 NOTE — TELEPHONE ENCOUNTER
Type of outreach:  Unable to leave message as VM not set up.  Health Maintenance Due   Topic Date Due     SPIROMETRY ONETIME  1945     HEPATITIS C SCREENING  10/16/1965     ADVANCE DIRECTIVE PLANNING Q5 YRS  10/16/2002     DEXA SCAN SCREENING (SYSTEM ASSIGNED)  10/16/2012     PNEUMOCOCCAL (2 of 2 - PCV13) 07/31/2014     COPD ACTION PLAN Q1 YR  04/25/2015     MAMMO SCREEN Q2 YR (SYSTEM ASSIGNED)  04/07/2017     FIT Q1 YR  09/28/2017     PHQ-9 Q6 MONTHS  09/02/2018     Yin PHILLIPS M.A.

## 2018-10-27 ENCOUNTER — NURSE TRIAGE (OUTPATIENT)
Dept: NURSING | Facility: CLINIC | Age: 73
End: 2018-10-27

## 2018-10-27 NOTE — TELEPHONE ENCOUNTER
"Woody Hurd is Mariah's foster care provider. Phone number is [phone].  Mariah complained earlier of left foot pain, left eye pain and bilateral wrist pain.  Mariah stated today that her eye pain has resolved. Woody noted some redness of Mariah's right wrist. There is no swelling and Mariah is using both hands without difficulty.  Mariah's left foot appears to be a little swollen per Woody. Mariah has a mild limp that was just noticed this afternoon. Mariah cannot recall any injury.  At this time I instructed Woody to continue to monitor Mariah as Woody stated Mariah has dementia. Call back if symptoms worsen or don't resolve in the next three days. Woody stated understanding and agreement.  Woody doesn't know Mariah well. Mariah just came to live in her home on 10/19/2018.    Additional Information    Negative: Shock suspected (e.g., cold/pale/clammy skin, too weak to stand, low BP, rapid pulse)    Negative: [1] Similar pain previously AND [2] it was from \"heart attack\"    Negative: [1] Similar pain previously AND [2] it was from \"angina\" AND [3] not relieved by nitroglycerin    Negative: Sounds like a life-threatening emergency to the triager    Negative: Difficulty breathing or unusual sweating (e.g., sweating without exertion)    Negative: [1] Age > 40 AND [2] associated chest or jaw pain AND [3] pain lasts > 5 minutes    Negative: [1] Age > 40 AND [2] no obvious cause AND [3] pain even when not moving the arm    (Exception: pain is clearly made worse by moving arm or bending neck)    Negative: [1] SEVERE pain AND [2] not improved 2 hours after pain medicine    Negative: [1] Red area or streak AND [2] fever    Negative: [1] Swollen joint AND [2] fever    Negative: Patient sounds very sick or weak to the triager    Negative: [1] Red area or streak AND [2] large (> 2 in. or 5 cm)    Negative: Entire arm is swollen    Negative: [1] Cast on wrist or arm AND [2] now increased pain    Negative: Weakness (i.e., loss of " "strength) in hand or fingers     (Exception: not truly weak; hand feels weak because of pain)    Negative: [1] Arm pains with exertion (e.g., walking) AND [2] pain goes away on resting AND [3] not present now    Negative: [1] Painful rash AND [2] multiple small blisters grouped together (i.e., dermatomal distribution or \"band\" or \"stripe\")    Negative: Looks like a boil, infected sore, deep ulcer or other infected rash (spreading redness, pus)    Negative: [1] Localized rash is very painful AND [2] no fever    Negative: Localized pain, redness or hard lump along vein    Negative: Numbness (i.e., loss of sensation) in hand or fingers    Negative: [1] MODERATE pain (e.g., interferes with normal activities) AND [2] present > 3 days    Negative: Pain is worsened or caused by bending the neck    Negative: [1] MILD pain (e.g., does not interfere with normal activities) AND [2] present > 7 days    Negative: Arm pain is a chronic symptom (recurrent or ongoing AND present > 4 weeks)    Negative: Caused by strained muscle (all triage questions negative)    Negative: Caused by overuse from recent vigorous activity (e.g., sports, lifting, physical work) (all triage questions negative)    Negative: Caused by phantom arm pain (all triage questions negative)    Arm pain (all triage questions negative)    Protocols used: ARM PAIN-ADULT-AH  Paula RUVALCABA RN [facility]       "

## 2018-11-05 ENCOUNTER — RADIANT APPOINTMENT (OUTPATIENT)
Dept: GENERAL RADIOLOGY | Facility: CLINIC | Age: 73
End: 2018-11-05
Attending: PHYSICIAN ASSISTANT
Payer: MEDICARE

## 2018-11-05 ENCOUNTER — OFFICE VISIT (OUTPATIENT)
Dept: FAMILY MEDICINE | Facility: CLINIC | Age: 73
End: 2018-11-05
Payer: MEDICARE

## 2018-11-05 VITALS
BODY MASS INDEX: 16.56 KG/M2 | TEMPERATURE: 98.6 F | HEART RATE: 87 BPM | DIASTOLIC BLOOD PRESSURE: 70 MMHG | HEIGHT: 64 IN | RESPIRATION RATE: 14 BRPM | OXYGEN SATURATION: 92 % | SYSTOLIC BLOOD PRESSURE: 139 MMHG | WEIGHT: 97 LBS

## 2018-11-05 DIAGNOSIS — S42.215D CLOSED NONDISPLACED FRACTURE OF SURGICAL NECK OF LEFT HUMERUS WITH ROUTINE HEALING, UNSPECIFIED FRACTURE MORPHOLOGY, SUBSEQUENT ENCOUNTER: ICD-10-CM

## 2018-11-05 DIAGNOSIS — M25.532 LEFT WRIST PAIN: Primary | ICD-10-CM

## 2018-11-05 DIAGNOSIS — M25.532 LEFT WRIST PAIN: ICD-10-CM

## 2018-11-05 PROCEDURE — 99213 OFFICE O/P EST LOW 20 MIN: CPT | Performed by: PHYSICIAN ASSISTANT

## 2018-11-05 PROCEDURE — 73110 X-RAY EXAM OF WRIST: CPT | Mod: LT

## 2018-11-05 NOTE — MR AVS SNAPSHOT
After Visit Summary   11/5/2018    Mariah Winslow    MRN: [id number]           Patient Information     Date Of Birth          1945        Visit Information        Provider Department      11/5/2018 1:30 PM Ayde Vaca PA-C [facility]        Today's Diagnoses     Left wrist pain    -  1    Closed nondisplaced fracture of surgical neck of left humerus with routine healing, unspecified fracture morphology, subsequent encounter           Follow-ups after your visit        Additional Services     ORTHO  REFERRAL       [facility] is referring you to the Orthopedic  Services at [facility].       The  Representative will assist you in the coordination of your Orthopedic and Musculoskeletal Care as prescribed by your physician.    The  Representative will call you within 1 business day to help schedule your appointment, or you may contact the  Representative at:    All areas ~ [phone]     Type of Referral : Surgical / Specialist       Timeframe requested: Routine    Coverage of these services is subject to the terms and limitations of your health insurance plan.  Please call member services at your health plan with any benefit or coverage questions.      If X-rays, CT or MRI's have been performed, please contact the facility where they were done to arrange for , prior to your scheduled appointment.  Please bring this referral request to your appointment and present it to your specialist.                  Follow-up notes from your care team     Return in about 1 week (around 11/12/2018) for If symptoms worsen or fail to improve.      Who to contact     If you have questions or need follow up information about today's clinic visit or your schedule please contact [facility] directly at [phone].  Normal or non-critical lab and imaging results will be  "communicated to you by MyChart, letter or phone within 4 business days after the clinic has received the results. If you do not hear from us within 7 days, please contact the clinic through MyChart or phone. If you have a critical or abnormal lab result, we will notify you by phone as soon as possible.  Submit refill requests through [employer] or call your pharmacy and they will forward the refill request to us. Please allow 3 business days for your refill to be completed.          Additional Information About Your Visit        Care EveryWhere ID     This is your Care EveryWhere ID. This could be used by other organizations to access your [facility] medical records  [id number]        Your Vitals Were     Pulse Temperature Respirations Height Last Period Pulse Oximetry    87 98.6  F (37  C) (Oral) 14 5' 3.75\" (1.619 m) (LMP Unknown) 92%    Breastfeeding? BMI (Body Mass Index)                No 16.78 kg/m2           Blood Pressure from Last 3 Encounters:   11/05/18 139/70   03/09/18 134/72   03/02/18 114/74    Weight from Last 3 Encounters:   11/05/18 97 lb (44 kg)   03/09/18 95 lb 14.4 oz (43.5 kg)   03/02/18 92 lb 1.6 oz (41.8 kg)              We Performed the Following     ORTHO  REFERRAL        Primary Care Provider Office Phone # Fax #    DASIA Mccurdy [phone]       [address] [facility] [address]        Goals        General    Transportation (pt-stated)     Notes - Note created  3/23/2018  9:35 AM by Colleen Chow MSW Karen will contact friends and/or agencies to help with transportation to come to appointments at the [facility].         Equal Access to Services     [facility] : Hadii alfredo Dimas, wajoséda luqadaha, qaybta kaalmada yessenia guerra. So [facility] [phone].    ATENCIÓN: Si habla español, tiene a carcamo disposición servicios gratuitos de asistencia lingüística. Llame al " [phone].    We comply with applicable federal civil rights laws and [place] laws. We do not discriminate on the basis of race, color, national origin, age, disability, sex, sexual orientation, or gender identity.            Thank you!     Thank you for choosing [facility]  for your care. Our goal is always to provide you with excellent care. Hearing back from our patients is one way we can continue to improve our services. Please take a few minutes to complete the written survey that you may receive in the mail after your visit with us. Thank you!             Your Updated Medication List - Protect others around you: Learn how to safely use, store and throw away your medicines at www.disposemymeds.org.      Notice  As of 11/5/2018  2:17 PM    You have not been prescribed any medications.

## 2018-11-05 NOTE — PROGRESS NOTES
SUBJECTIVE:   Mariah Winslow is a 73 year old female who presents to clinic today for the following health issues:      ED/UC Followup:    Facility:  [facility]  Date of visit: 11/1/18 and 10/29/18  Reason for visit: closed displaced fracture of surgical neck of left humerus on 11/1; closed fracture of right wrist, abrasion of face, and dementia without behavioral disturbance  Current Status: still has severe bruising on right hand, very swollen; yellow and purple       Patient is here today for evaluation of left wrist/hand swelling and bruising  Ongoing since her fall and subsequent fracture on 11/1/18  Her sister provides most of the hx due to patient's dementia and notes that she was diagnosed with humeral fracture but refuses to wear the sling  She admits to some pain  There was no xray of the lower left extremity      Problem list and histories reviewed & adjusted, as indicated.  Additional history: as documented    Patient Active Problem List   Diagnosis     COPD (chronic obstructive pulmonary disease) (H)     Hyperlipidemia LDL goal <130     Major depression in complete remission (H)     Tooth loss     Protein-calorie malnutrition (H)     Anxiety     Past Surgical History:   Procedure Laterality Date     APPENDECTOMY  1982     OOPHORECTOMY Left 1982       Social History   Substance Use Topics     Smoking status: Current Every Day Smoker     Packs/day: 0.50     Years: 35.00     Smokeless tobacco: Never Used     Alcohol use Yes      Comment: 2 times per week, 2 beers per time     Family History   Problem Relation Age of Onset     Diabetes Maternal Aunt      Hypertension Sister      Myocardial Infarction Maternal Uncle      Myocardial Infarction Paternal Uncle      HEART DISEASE Sister 62     Stent placed         No current outpatient prescriptions on file.     Allergies   Allergen Reactions     Penicillins        Reviewed and updated as needed this visit by clinical staff  Tobacco  Allergies  Meds   "Med Hx  Surg Hx  Fam Hx  Soc Hx      Reviewed and updated as needed this visit by Provider         ROS:  Constitutional, HEENT, cardiovascular, pulmonary, gi and gu systems are negative, except as otherwise noted.    OBJECTIVE:     /70 (BP Location: Right arm, Patient Position: Chair, Cuff Size: Adult Regular)  Pulse 87  Temp 98.6  F (37  C) (Oral)  Resp 14  Ht 5' 3.75\" (1.619 m)  Wt 97 lb (44 kg)  LMP  (LMP Unknown)  SpO2 92%  Breastfeeding? No  BMI 16.78 kg/m2  Body mass index is 16.78 kg/(m^2).  GENERAL: healthy, alert and no distress  NECK: no adenopathy, no asymmetry, masses, or scars and thyroid normal to palpation  RESP: lungs clear to auscultation - no rales, rhonchi or wheezes  CV: regular rate and rhythm, normal S1 S2, no S3 or S4, no murmur, click or rub, no peripheral edema and peripheral pulses strong  ABDOMEN: soft, nontender, no hepatosplenomegaly, no masses and bowel sounds normal  MS: right wrist in short arm cast left forearm is swollen and bruised, tender to palpation at wrist. Unable to fully visualize arm due to clothing and refusal to take off but there is swelling of left shoulder and bruising present. Limited ROM of left shoulder    Diagnostic Test Results:  Xray - left wrist: negative    ASSESSMENT/PLAN:             1. Left wrist pain  New problem, suspect given swelling and bruising she has some pain. Xray today negative. Discussed swelling and bruising is likely from left humerus fracture and gravity.  - XR Wrist Left G/E 3 Views; Future    2. Closed nondisplaced fracture of surgical neck of left humerus with routine healing, unspecified fracture morphology, subsequent encounter  Chronic issue, non compliant with sling. Spoke with Dr. Chi who recommended close following with ortho because it may end up displaced. Referral provided.  - ORTHO  REFERRAL    Risks, benefits and alternatives were discussed with patient. Agreeable to the plan of care.      Ayde " Jewels Vaca PA-C  [facility]

## 2018-11-05 NOTE — LETTER
My COPD Action Plan     Name: Mariah Winslow    YOB: 1945   Date: 11/5/2018    My doctor: Ayde Vaca PA-C   My clinic: [address] [address] [phone]  My Controller Medicine: { :832129}   Dose: ***     My Rescue Medicine: { :993914}   Dose: ***     My Flare Up Medicine: { :268345}   Dose: ***     My COPD Severity: { :100035}      Use of Oxygen: { :272821}     Make sure you've had your pneumonia   vaccines.          GREEN ZONE       Doing well today      Usual level of activity and exercise    Usual amount of cough and mucus    No shortness of breath    Usual level of health (thinking clearly, sleeping well, feel like eating) Actions:      Take daily medicines    Use oxygen as prescribed    Follow regular exercise and diet plan    Avoid cigarette smoke and other irritants that harm the lungs           YELLOW ZONE          Having a bad day or flare up      Short of breath more than usual    A lot more sputum (mucus) than usual    Sputum looks yellow, green, tan, brown or bloody    More coughing or wheezing    Fever or chills    Less energy; trouble completing activities    Trouble thinking or focusing    Using quick relief inhaler or nebulizer more often    Poor sleep; symptoms wake me up    Do not feel like eating Actions:      Get plenty of rest    Take daily medicines    Use quick relief inhaler every *** hours    If you use oxygen, call you doctor to see if you should adjust your oxygen    Do breathing exercises or other things to help you relax    Let a loved one, friend or neighbor know you are feeling worse    Call your care team if you have 2 or more symptoms.  Start taking steroids or antibiotics if directed by your care team           RED ZONE       Need medical care now      Severe shortness of breath (feel you can't breathe)    Fever, chills    Not enough breath to do any activity    Trouble coughing up mucus,  walking or talking    Blood in mucus    Frequent coughing   Rescue medicines are not working    Not able to sleep because of breathing    Feel confused or drowsy    Chest pain    Actions:      Call your health care team.  If you cannot reach your care team, call 911 or go to the emergency room.        Annual Reminders:  Meet with Care Team, Flu Shot every Fall  Pharmacy: [facility] [place] - [phone]

## 2018-11-06 ENCOUNTER — TELEPHONE (OUTPATIENT)
Dept: FAMILY MEDICINE | Facility: CLINIC | Age: 73
End: 2018-11-06

## 2018-11-06 NOTE — TELEPHONE ENCOUNTER
This was not discussed at her visit, I will not be sending this in. She needs to address this with either her PCP or Neurologist.    Ayde Vaca PA-C

## 2018-11-06 NOTE — TELEPHONE ENCOUNTER
Tried to call home number to see if she has been on this before and if so what dose.  Voicemail not set up.      Called cell and spoke to her sister, Shawna.  She has not been on aricept before.      Will forward to Ayde Cronin for advisal.

## 2018-11-06 NOTE — TELEPHONE ENCOUNTER
Patient was seen yesterday and they thought there was going to be a RX sent for Ariacept for dementia medication when they contacted the pharmacy it was not there, please fill and send to the [facility] please, they will be in the area tmw would like to  rx

## 2018-11-09 ENCOUNTER — OFFICE VISIT (OUTPATIENT)
Dept: FAMILY MEDICINE | Facility: CLINIC | Age: 73
End: 2018-11-09
Payer: MEDICARE

## 2018-11-09 VITALS
HEART RATE: 84 BPM | SYSTOLIC BLOOD PRESSURE: 122 MMHG | OXYGEN SATURATION: 98 % | BODY MASS INDEX: 17.11 KG/M2 | TEMPERATURE: 98.6 F | WEIGHT: 98.9 LBS | DIASTOLIC BLOOD PRESSURE: 70 MMHG

## 2018-11-09 DIAGNOSIS — F03.90 DEMENTIA WITHOUT BEHAVIORAL DISTURBANCE, UNSPECIFIED DEMENTIA TYPE: Primary | ICD-10-CM

## 2018-11-09 DIAGNOSIS — E46 PROTEIN-CALORIE MALNUTRITION, UNSPECIFIED SEVERITY (H): ICD-10-CM
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
## 2018-11-09 PROCEDURE — 99214 OFFICE O/P EST MOD 30 MIN: CPT | Performed by: NURSE PRACTITIONER

## 2018-11-09 RX ORDER — OMEGA-3 FATTY ACIDS/FISH OIL 300-1000MG
200 CAPSULE ORAL EVERY 4 HOURS PRN
Qty: 120 CAPSULE | COMMUNITY
Start: 2018-11-09 | End: 2020-03-26

## 2018-11-09 NOTE — PROGRESS NOTES
SUBJECTIVE:   Mariah Winslow is a 73 year old female who presents to clinic today for the following health issues:        Dementia follow up--patient and her daughter are here and have concerns with living situation and sister wants to discuss medication for dementia.  Patient is not interested in any changes in treatment and refuses all treatment at this time.  Sister will be getting power of  soon and then will be able to make treatment recommendations for patient.  Sister briefly mentioned desire for aricept treatment at this time.  Given legal concerns will defer currently as patient is not interested in med changes.      Wt Readings from Last 4 Encounters:   11/09/18 98 lb 14.4 oz (44.9 kg)   11/05/18 97 lb (44 kg)   03/09/18 95 lb 14.4 oz (43.5 kg)   03/02/18 92 lb 1.6 oz (41.8 kg)         Fall  Patient recently fell and fractured her right wrist and the surgical neck of her left humerus. She has color changes located at her right hand. Patient continues to have right wrist and arm pain. She fell when leaving her foster home and wandering outside according to the foster staff.    Also  The patient is currently living at an adult foster home. Her sister is concerned about the care she is receiving at the foster home. She was not given her pain medications for a couple of days and they are worried she was given sleep medication. The patient is having new housing arranged with the possibility of moving into memory care or living with her sister. It will take at least one month to arrange the new housing. Sister would like pt to be drug tested to see if patient was drugged.  However patient refuses any testing today.  Did discuss uncertainty of medicare covering drug testing and sister then declines testing.  County has been out to interview patient and sister and have evaluated the living situation.    Problem list and histories reviewed & adjusted, as indicated.  Additional history: as  documented    Labs reviewed in EPIC    Reviewed and updated as needed this visit by clinical staff  Tobacco  Allergies  Meds  Problems  Med Hx  Surg Hx  Fam Hx  Soc Hx        Reviewed and updated as needed this visit by Provider  Allergies  Meds  Problems         Patient Active Problem List   Diagnosis     COPD (chronic obstructive pulmonary disease) (H)     Hyperlipidemia LDL goal <130     Major depression in complete remission (H)     Tooth loss     Protein-calorie malnutrition (H)     Anxiety     Past Surgical History:   Procedure Laterality Date     APPENDECTOMY  1982     OOPHORECTOMY Left 1982       Social History   Substance Use Topics     Smoking status: Current Every Day Smoker     Packs/day: 0.50     Years: 35.00     Smokeless tobacco: Never Used     Alcohol use Yes      Comment: 2 times per week, 2 beers per time     Family History   Problem Relation Age of Onset     Diabetes Maternal Aunt      Hypertension Sister      Myocardial Infarction Maternal Uncle      Myocardial Infarction Paternal Uncle      HEART DISEASE Sister 62     Stent placed           ROS:  INTEGUMENTARY/SKIN: POSITIVE for color change as noted above   MUSCULOSKELETAL: POSITIVE  for arm and shoulder pain as noted above   NEURO: POSITIVE for Hx of dementia as noted above     This document serves as a record of the services and decisions personally performed and made by DASIA Mccurdy CNP. It was created on her behalf by Marjorie Dent, a trained medical scribe. The creation of this document is based on the provider's statements to the medical scribe.  Marjorie Dent 3:59 PM November 9, 2018    OBJECTIVE:                                                    /70  Pulse 84  Temp 98.6  F (37  C) (Oral)  Wt 98 lb 14.4 oz (44.9 kg)  LMP  (LMP Unknown)  SpO2 98%  BMI 17.11 kg/m2  Body mass index is 17.11 kg/(m^2).  GENERAL: Pale, disconnected from conversation.  Does not recognize clinician at this time, is unwilling to  stay for duration of visit.  RESP: lungs clear to auscultation - no rales, rhonchi or wheezes  CV: regular rate and rhythm, normal S1 S2, no S3 or S4, no murmur, click or rub, no peripheral edema   Wt Readings from Last 4 Encounters:   11/09/18 98 lb 14.4 oz (44.9 kg)   11/05/18 97 lb (44 kg)   03/09/18 95 lb 14.4 oz (43.5 kg)   03/02/18 92 lb 1.6 oz (41.8 kg)       Diagnostic Test Results:  No results found for this or any previous visit (from the past 24 hour(s)).     ASSESSMENT/PLAN:                                                    A/P:    ICD-10-CM    1. Dementia without behavioral disturbance, unspecified dementia type F03.90 NEUROLOGY ADULT REFERRAL   2. Fall, subsequent encounter W19.XXXD    3. Protein-calorie malnutrition, unspecified severity (H) E46        Dementia--progressing significantly.  Once power of  is established can consider treatment options.  Would value second opinion in order to assist family in clear and realistic planning of care.      Fall-- evaluated and improving, continue symptomatic cares.      Weight is stable.      Will be in touch with sister this week to continue dialogue about plan of care and next steps  There are no Patient Instructions on file for this visit.    The information in this document, created by the medical scribe for me, accurately reflects the services I personally performed and the decisions made by me. I have reviewed and approved this document for accuracy prior to leaving the patient care area.  November 9, 2018 4:17 PM    DASIA Mccurdy [facility]

## 2018-11-09 NOTE — MR AVS SNAPSHOT
After Visit Summary   11/9/2018    Mariah Winslow    MRN: [id number]           Patient Information     Date Of Birth          1945        Visit Information        Provider Department      11/9/2018 3:20 PM Agnes Montejo APRN CNP [facility]        Today's Diagnoses     Fall, subsequent encounter    -  1    Protein-calorie malnutrition, unspecified severity (H)           Follow-ups after your visit        Who to contact     If you have questions or need follow up information about today's clinic visit or your schedule please contact [facility] directly at [phone].  Normal or non-critical lab and imaging results will be communicated to you by MyChart, letter or phone within 4 business days after the clinic has received the results. If you do not hear from us within 7 days, please contact the clinic through MyChart or phone. If you have a critical or abnormal lab result, we will notify you by phone as soon as possible.  Submit refill requests through [employer] or call your pharmacy and they will forward the refill request to us. Please allow 3 business days for your refill to be completed.          Additional Information About Your Visit        Care EveryWhere ID     This is your Care EveryWhere ID. This could be used by other organizations to access your [facility] medical records  [id number]        Your Vitals Were     Pulse Temperature Last Period Pulse Oximetry BMI (Body Mass Index)       84 98.6  F (37  C) (Oral) (LMP Unknown) 98% 17.11 kg/m2        Blood Pressure from Last 3 Encounters:   11/09/18 122/70   11/05/18 139/70   03/09/18 134/72    Weight from Last 3 Encounters:   11/09/18 98 lb 14.4 oz (44.9 kg)   11/05/18 97 lb (44 kg)   03/09/18 95 lb 14.4 oz (43.5 kg)              Today, you had the following     No orders found for display       Primary Care Provider Office Phone # Fax #    DASIA Mccurdy [phone]  [facility] [id number]        Goals        General    Transportation (pt-stated)     Notes - Note created  3/23/2018  9:35 AM by Colleen Chow MSW    Mariah will contact friends and/or agencies to help with transportation to come to appointments at the [facility].         Equal Access to Services     BERNARDO MENDEZ : Hadii aad ku hadasho Soomaali, waaxda luqadaha, qaybta kaalmada adeegyada, waxay seb haymartitan eduar anguianomayitobenny andersonn ah. So [facility] [phone].    ATENCIÓN: Si habla español, tiene a carcamo disposición servicios gratuitos de asistencia lingüística. [facility] [phone].    We comply with applicable federal civil rights laws and [place] laws. We do not discriminate on the basis of race, color, national origin, age, disability, sex, sexual orientation, or gender identity.            Thank you!     Thank you for choosing [facility]  for your care. Our goal is always to provide you with excellent care. Hearing back from our patients is one way we can continue to improve our services. Please take a few minutes to complete the written survey that you may receive in the mail after your visit with us. Thank you!             Your Updated Medication List - Protect others around you: Learn how to safely use, store and throw away your medicines at www.disposemymeds.org.          This list is accurate as of 11/9/18  4:17 PM.  Always use your most recent med list.                   Brand Name Dispense Instructions for use Diagnosis    ibuprofen 200 MG capsule     120 capsule    Take 200 mg by mouth every 4 hours as needed for fever

## 2019-02-26 ENCOUNTER — OFFICE VISIT (OUTPATIENT)
Dept: PEDIATRICS | Facility: CLINIC | Age: 74
End: 2019-02-26
Payer: MEDICARE

## 2019-02-26 VITALS
OXYGEN SATURATION: 94 % | HEIGHT: 64 IN | BODY MASS INDEX: 17.16 KG/M2 | TEMPERATURE: 98.4 F | SYSTOLIC BLOOD PRESSURE: 100 MMHG | RESPIRATION RATE: 20 BRPM | DIASTOLIC BLOOD PRESSURE: 62 MMHG | WEIGHT: 100.5 LBS | HEART RATE: 93 BPM

## 2019-02-26 DIAGNOSIS — F03.90 DEMENTIA WITHOUT BEHAVIORAL DISTURBANCE, UNSPECIFIED DEMENTIA TYPE: Primary | ICD-10-CM

## 2019-02-26 DIAGNOSIS — J44.9 CHRONIC OBSTRUCTIVE PULMONARY DISEASE, UNSPECIFIED COPD TYPE (H): ICD-10-CM

## 2019-02-26 PROCEDURE — G0439 PPPS, SUBSEQ VISIT: HCPCS | Performed by: FAMILY MEDICINE

## 2019-02-26 ASSESSMENT — ENCOUNTER SYMPTOMS
DIZZINESS: 0
HEMATOCHEZIA: 0
HEARTBURN: 0
CONSTIPATION: 0
FREQUENCY: 0
SHORTNESS OF BREATH: 0
COUGH: 0
NERVOUS/ANXIOUS: 0
ARTHRALGIAS: 0
ABDOMINAL PAIN: 0
CHILLS: 0
COUGH: 1
HEADACHES: 0
HEMATURIA: 0
JOINT SWELLING: 0
EYE PAIN: 0
CONFUSION: 1
DIARRHEA: 0
FEVER: 0

## 2019-02-26 ASSESSMENT — ACTIVITIES OF DAILY LIVING (ADL)
CURRENT_FUNCTION: LAUNDRY REQUIRES ASSISTANCE
CURRENT_FUNCTION: TELEPHONE REQUIRES ASSISTANCE
CURRENT_FUNCTION: SHOPPING REQUIRES ASSISTANCE
CURRENT_FUNCTION: PREPARING MEALS REQUIRES ASSISTANCE
CURRENT_FUNCTION: HOUSEWORK REQUIRES ASSISTANCE
CURRENT_FUNCTION: BATHING REQUIRES ASSISTANCE
CURRENT_FUNCTION: MONEY MANAGEMENT REQUIRES ASSISTANCE
CURRENT_FUNCTION: TRANSPORTATION REQUIRES ASSISTANCE

## 2019-02-26 ASSESSMENT — PATIENT HEALTH QUESTIONNAIRE - PHQ9
SUM OF ALL RESPONSES TO PHQ QUESTIONS 1-9: 3
10. IF YOU CHECKED OFF ANY PROBLEMS, HOW DIFFICULT HAVE THESE PROBLEMS MADE IT FOR YOU TO DO YOUR WORK, TAKE CARE OF THINGS AT HOME, OR GET ALONG WITH OTHER PEOPLE: NOT DIFFICULT AT ALL
SUM OF ALL RESPONSES TO PHQ QUESTIONS 1-9: 3

## 2019-02-26 ASSESSMENT — MIFFLIN-ST. JEOR: SCORE: 951.9

## 2019-02-26 NOTE — PROGRESS NOTES
"SUBJECTIVE:   Mariah Winslow is a 73 year old female who presents for Preventive Visit.      Are you in the first 12 months of your Medicare coverage?  No    Annual Wellness Visit     In general, how would you rate your overall health?  Good    Frequency of exercise:  None    Do you usually eat at least 4 servings of fruit and vegetables a day, include whole grains    & fiber and avoid regularly eating high fat or \"junk\" foods?  No    Taking medications regularly:  Not Applicable    Medication side effects:  Not applicable    Ability to successfully perform activities of daily living:  Telephone requires assistance, transportation requires assistance, shopping requires assistance, preparing meals requires assistance, housework requires assistance, bathing requires assistance, laundry requires assistance and money management requires assistance    Home Safety:  No safety concerns identified    Hearing Impairment:  Difficulty following a conversation in a noisy restaurant or crowded room, difficulty following dialogue in the theater, difficult to understand a speaker at a public meeting or [employer] service and difficulty understanding soft or whispered speech    In the past 6 months, have you been bothered by leaking of urine?  No    In general, how would you rate your overall mental or emotional health?  Poor    PHQ-2 Total Score: 3    Additional concerns today:  Yes    Do you feel safe in your environment? Yes    Do you have a Health Care Directive? No: Advance care planning was reviewed with patient; patient declined at this time.      Fall risk  Fallen 2 or more times in the past year?: No  Any fall with injury in the past year?: No    Cognitive Screening Not appropriate due to known dementia    Do you have sleep apnea, excessive snoring or daytime drowsiness?: no      Patient is here with her sister who is a primary caregiver.  She is being seen prior to admission to a memory care unit.  Patient has been " living between her sisters and her granddaughters houses.  Dementia has been a problem in the last year and a half.    Patient is not on any prescription medications.  She once was on Paxil but has not been on this for over a year.  She at times is refusing help or cares.  Evidently was found outside wandering at one time.  No recent other acute  medical problems.      Her history does mention COPD and I understand she is a former smoker.        Reviewed and updated as needed this visit by clinical staff  Tobacco  Allergies  Meds  Med Hx  Surg Hx  Fam Hx  Soc Hx        Reviewed and updated as needed this visit by Provider        Social History     Tobacco Use     Smoking status: Current Every Day Smoker     Packs/day: 1.50     Years: 35.00     Pack years: 52.50     Smokeless tobacco: Never Used   Substance Use Topics     Alcohol use: Yes     Comment: 2 times per week, 2 beers per time       Alcohol Use 2/26/2019   If you drink alcohol do you typically have greater than 3 drinks per day OR greater than 7 drinks per week? Not Applicable               Current providers sharing in care for this patient include:   Patient Care Team:  Agnes Montejo APRN CNP as PCP - General (Family Practice)  Agnes Montejo APRN CNP as PCP - Assigned PCP    The following health maintenance items are reviewed in Epic and correct as of today:  Health Maintenance   Topic Date Due     SPIROMETRY ONETIME  1945     HEPATITIS C SCREENING  10/16/1965     ZOSTER IMMUNIZATION (1 of 2) 10/16/1997     ADVANCE DIRECTIVE PLANNING Q5 YRS  10/16/2002     MEDICARE ANNUAL WELLNESS VISIT  10/16/2012     DEXA SCAN SCREENING (SYSTEM ASSIGNED)  10/16/2012     PNEUMOCOCCAL IMMUNIZATION 65+ LOW/MEDIUM RISK (2 of 2 - PCV13) 07/31/2014     COPD ACTION PLAN Q1 YR  04/25/2015     MAMMO SCREEN Q2 YR (SYSTEM ASSIGNED)  04/07/2017     FIT Q1 YR  09/28/2017     INFLUENZA VACCINE (1) 09/01/2018     DEBBIE QUESTIONNAIRE 6 MONTHS  09/02/2018     PHQ-9 Q6  "MONTHS  09/02/2018     FALL RISK ASSESSMENT  03/02/2019     LIPID SCREEN Q5 YR FEMALE (SYSTEM ASSIGNED)  05/09/2019     DTAP/TDAP/TD IMMUNIZATION (2 - Td) 06/06/2022     DEPRESSION ACTION PLAN  Completed     IPV IMMUNIZATION  Aged Out     MENINGITIS IMMUNIZATION  Aged Out           Review of Systems   Constitutional: Negative for chills and fever.   HENT: Positive for hearing loss. Negative for congestion and ear pain.    Eyes: Negative for pain.   Respiratory: Positive for cough. Negative for shortness of breath.    Cardiovascular: Negative for chest pain and peripheral edema.   Gastrointestinal: Negative for abdominal pain, constipation, diarrhea, heartburn and hematochezia.   Genitourinary: Negative for frequency, genital sores and hematuria.   Musculoskeletal: Negative for arthralgias and joint swelling.   Skin: Negative for rash.   Neurological: Negative for dizziness and headaches.   Psychiatric/Behavioral: Positive for confusion. The patient is not nervous/anxious.          OBJECTIVE:   Temp 98.4  F (36.9  C) (Oral)   Resp 20   Ht 1.619 m (5' 3.75\")   Wt 45.6 kg (100 lb 8 oz)   LMP  (LMP Unknown)   BMI 17.39 kg/m   Estimated body mass index is 17.39 kg/m  as calculated from the following:    Height as of this encounter: 1.619 m (5' 3.75\").    Weight as of this encounter: 45.6 kg (100 lb 8 oz).  Physical Exam   Constitutional:   Thin, alert.   HENT:   Head: Normocephalic.   Eyes: Pupils are equal, round, and reactive to light. No scleral icterus.   Neck: No thyromegaly present.   Cardiovascular: Normal rate and regular rhythm.   No murmur heard.  Pulmonary/Chest: Effort normal and breath sounds normal.   Abdominal: She exhibits no distension. There is no tenderness.   Musculoskeletal: She exhibits no edema or tenderness.   Neurological: Coordination normal.   Skin: No rash noted.   Psychiatric:     Patient is confused and does not carry on a coherent conversation.         ASSESSMENT / PLAN:   1. Dementia " "without behavioral disturbance, unspecified dementia type      2. Chronic obstructive pulmonary disease, unspecified COPD type (H)        End of Life Planning:  Patient currently has an advanced directive:     COUNSELING:  Reviewed preventive health counseling, as reflected in patient instructions       planning memory unit    BP Readings from Last 1 Encounters:   11/09/18 122/70     Estimated body mass index is 17.39 kg/m  as calculated from the following:    Height as of this encounter: 1.619 m (5' 3.75\").    Weight as of this encounter: 45.6 kg (100 lb 8 oz).           reports that she has been smoking.  She has a 52.50 pack-year smoking history. she has never used smokeless tobacco.      Appropriate preventive services were discussed with this patient, including applicable screening as appropriate for cardiovascular disease, diabetes, osteopenia/osteoporosis, and glaucoma.  As appropriate for age/gender, discussed screening for colorectal cancer, prostate cancer, breast cancer, and cervical cancer. Checklist reviewing preventive services available has been given to the patient.    Reviewed patients plan of care and provided an AVS. The Basic Care Plan (routine screening as documented in Health Maintenance) for Mariah meets the Care Plan requirement. This Care Plan has been established and reviewed with the sister.    Counseling Resources:  ATP IV Guidelines  Pooled Cohorts Equation Calculator  Breast Cancer Risk Calculator  FRAX Risk Assessment  ICSI Preventive Guidelines  Dietary Guidelines for Americans, 2010  USDA's MyPlate  ASA Prophylaxis  Lung CA Screening    Lenny Thacker MD  [facility]  "

## 2019-02-26 NOTE — PATIENT INSTRUCTIONS
Preventive Health Recommendations    See your health care provider every year to    Review health changes.     Discuss preventive care.      Review your medicines if your doctor has prescribed any.      You no longer need a yearly Pap test unless you've had an abnormal Pap test in the past 10 years. If you have vaginal symptoms, such as bleeding or discharge, be sure to talk with your provider about a Pap test.      Every 1 to 2 years, have a mammogram.  If you are over 69, talk with your health care provider about whether or not you want to continue having screening mammograms.      Every 10 years, have a colonoscopy. Or, have a yearly FIT test (stool test). These exams will check for colon cancer.       Have a cholesterol test every 5 years, or more often if your doctor advises it.       Have a diabetes test (fasting glucose) every three years. If you are at risk for diabetes, you should have this test more often.       At age 65, have a bone density scan (DEXA) to check for osteoporosis (brittle bone disease).    Shots:    Get a flu shot each year.    Get a tetanus shot every 10 years.    Talk to your doctor about your pneumonia vaccines. There are now two you should receive - Pneumovax (PPSV 23) and Prevnar (PCV 13).    Talk to your pharmacist about the shingles vaccine.    Talk to your doctor about the hepatitis B vaccine.    Nutrition:     Eat at least 5 servings of fruits and vegetables each day.      Eat whole-grain bread, whole-wheat pasta and brown rice instead of white grains and rice.      Get adequate Calcium and Vitamin D.     Lifestyle    Exercise at least 150 minutes a week (30 minutes a day, 5 days a week). This will help you control your weight and prevent disease.      Limit alcohol to one drink per day.      No smoking.       Wear sunscreen to prevent skin cancer.       See your dentist twice a year for an exam and cleaning.      See your eye doctor every 1 to 2 years to screen for conditions  such as glaucoma, macular degeneration and cataracts.    Personalized Prevention Plan  You are due for the preventive services outlined below.  Your care team is available to assist you in scheduling these services.  If you have already completed any of these items, please share that information with your care team to update in your medical record.  Health Maintenance Due   Topic Date Due     Breathing Capacity Test  1945     Hepatitis C Screening  10/16/1965     Zoster (Shingles) Vaccine (1 of 2) 10/16/1997     Discuss Advance Directive Planning  10/16/2002     Annual Wellness Visit  10/16/2012     Bone Density Screening (Dexa)  10/16/2012     Pneumococcal Vaccine (2 of 2 - PCV13) 07/31/2014     COPD ACTION PLAN Q1 YR  04/25/2015     Mammogram - every 2 years  04/07/2017     Colon Cancer Screening - FIT Test - yearly  09/28/2017     Flu Vaccine (1) 09/01/2018     DEBBIE QUESTIONNAIRE 6 MONTHS  09/02/2018     Depression Assessment - every 6 months  09/02/2018     FALL RISK ASSESSMENT  03/02/2019

## 2019-02-27 ASSESSMENT — PATIENT HEALTH QUESTIONNAIRE - PHQ9: SUM OF ALL RESPONSES TO PHQ QUESTIONS 1-9: 3

## 2019-04-08 NOTE — TELEPHONE ENCOUNTER
"Routing refill request to provider for review/approval because:  Labs out of range:  ALT, AST, CR  Labs out of range: 3/9/18  Last Written Prescription Date: 11/9/18  Last Fill Quantity: 120,  # refills: 0  Last office visit: 11/9/2018 with prescribing provider:   Future Office Visit:      Requested Prescriptions   Pending Prescriptions Disp Refills     ibuprofen (ADVIL/MOTRIN) 200 MG capsule 120 capsule      Sig: Take 1 capsule (200 mg) by mouth every 4 hours as needed for fever       NSAID Medications Failed - 4/7/2019 10:59 AM        Failed - Normal ALT on file in past 12 months     Recent Labs   Lab Test 03/09/18  1500   ALT 18             Failed - Normal AST on file in past 12 months     Recent Labs   Lab Test 03/09/18  1500   AST 25             Failed - Patient is age 6-64 years        Failed - Normal CBC on file in past 12 months     Recent Labs   Lab Test 03/09/18  1500   WBC 8.2   RBC 3.44*   HGB 11.6*   HCT 34.5*                    Failed - Normal serum creatinine on file in past 12 months     Recent Labs   Lab Test 03/09/18  1500   CR 0.60             Passed - Blood pressure under 140/90 in past 12 months     BP Readings from Last 3 Encounters:   02/26/19 100/62   11/09/18 122/70   11/05/18 139/70                 Passed - Recent (12 mo) or future (30 days) visit within the authorizing provider's specialty     Patient had office visit in the last 12 months or has a visit in the next 30 days with authorizing provider or within the authorizing provider's specialty.  See \"Patient Info\" tab in inbasket, or \"Choose Columns\" in Meds & Orders section of the refill encounter.              Passed - Medication is active on med list        Passed - No active pregnancy on record        Passed - No positive pregnancy test in past 12 months        Chelo Quinn RN Flex    "

## 2019-04-12 RX ORDER — OMEGA-3 FATTY ACIDS/FISH OIL 300-1000MG
200 CAPSULE ORAL EVERY 4 HOURS PRN
Qty: 120 CAPSULE | OUTPATIENT
Start: 2019-04-12

## 2019-04-24 ENCOUNTER — TELEPHONE (OUTPATIENT)
Dept: FAMILY MEDICINE | Facility: CLINIC | Age: 74
End: 2019-04-24

## 2019-04-24 NOTE — TELEPHONE ENCOUNTER
Type of outreach:  None. Patient now resides at a memory care facility and see's physician there.   Health Maintenance Due   Topic Date Due     SPIROMETRY ONETIME  1945     HEPATITIS C SCREENING  10/16/1965     ZOSTER IMMUNIZATION (1 of 2) 10/16/1997     ADVANCE DIRECTIVE PLANNING Q5 YRS  10/16/2002     DEXA SCAN SCREENING (SYSTEM ASSIGNED)  10/16/2012     PNEUMOCOCCAL IMMUNIZATION 65+ LOW/MEDIUM RISK (2 of 2 - PCV13) 07/31/2014     COPD ACTION PLAN Q1 YR  04/25/2015     MAMMO SCREEN Q2 YR (SYSTEM ASSIGNED)  04/07/2017     FIT Q1 YR  09/28/2017     INFLUENZA VACCINE (1) 09/01/2018     DEBBIE QUESTIONNAIRE 6 MONTHS  09/02/2018     LIPID SCREEN Q5 YR FEMALE (SYSTEM ASSIGNED)  05/09/2019     Yin PHILLIPS M.A.

## 2020-03-26 ENCOUNTER — APPOINTMENT (OUTPATIENT)
Dept: ULTRASOUND IMAGING | Facility: CLINIC | Age: 75
End: 2020-03-26
Attending: EMERGENCY MEDICINE
Payer: MEDICARE

## 2020-03-26 ENCOUNTER — HOSPITAL ENCOUNTER (OUTPATIENT)
Facility: CLINIC | Age: 75
Setting detail: OBSERVATION
Discharge: HOME | End: 2020-03-27
Attending: EMERGENCY MEDICINE | Admitting: INTERNAL MEDICINE
Payer: MEDICARE

## 2020-03-26 DIAGNOSIS — D64.9 ANEMIA, UNSPECIFIED TYPE: ICD-10-CM

## 2020-03-26 DIAGNOSIS — I82.412 ACUTE DEEP VEIN THROMBOSIS (DVT) OF FEMORAL VEIN OF LEFT LOWER EXTREMITY (H): Primary | ICD-10-CM

## 2020-03-26 DIAGNOSIS — I82.4Z2 ACUTE DEEP VEIN THROMBOSIS (DVT) OF DISTAL VEIN OF LEFT LOWER EXTREMITY (H): ICD-10-CM

## 2020-03-26 PROBLEM — O22.30 DVT (DEEP VEIN THROMBOSIS) IN PREGNANCY: Status: ACTIVE | Noted: 2020-03-26

## 2020-03-26 PROBLEM — I82.419 ACUTE DEEP VEIN THROMBOSIS (DVT) OF FEMORAL VEIN (H): Status: ACTIVE | Noted: 2020-03-26

## 2020-03-26 LAB
ANION GAP SERPL CALCULATED.3IONS-SCNC: NORMAL MMOL/L (ref 6–17)
BASOPHILS # BLD AUTO: 0 10E9/L (ref 0–0.2)
BASOPHILS NFR BLD AUTO: 0.5 %
BUN SERPL-MCNC: NORMAL MG/DL (ref 7–30)
CALCIUM SERPL-MCNC: NORMAL MG/DL (ref 8.5–10.1)
CHLORIDE SERPL-SCNC: NORMAL MMOL/L (ref 94–109)
CO2 SERPL-SCNC: NORMAL MMOL/L (ref 20–32)
CREAT BLD-MCNC: 0.6 MG/DL (ref 0.52–1.04)
CREAT SERPL-MCNC: NORMAL MG/DL (ref 0.52–1.04)
DIFFERENTIAL METHOD BLD: ABNORMAL
EOSINOPHIL # BLD AUTO: 0.1 10E9/L (ref 0–0.7)
EOSINOPHIL NFR BLD AUTO: 2 %
ERYTHROCYTE [DISTWIDTH] IN BLOOD BY AUTOMATED COUNT: 13.3 % (ref 10–15)
GFR SERPL CREATININE-BSD FRML MDRD: >90 ML/MIN/{1.73_M2}
GFR SERPL CREATININE-BSD FRML MDRD: NORMAL ML/MIN/{1.73_M2}
GLUCOSE SERPL-MCNC: NORMAL MG/DL (ref 70–99)
HCT VFR BLD AUTO: 30.7 % (ref 35–47)
HEMOCCULT STL QL: POSITIVE
HGB BLD-MCNC: 10 G/DL (ref 11.7–15.7)
IMM GRANULOCYTES # BLD: 0 10E9/L (ref 0–0.4)
IMM GRANULOCYTES NFR BLD: 0.2 %
LYMPHOCYTES # BLD AUTO: 1.6 10E9/L (ref 0.8–5.3)
LYMPHOCYTES NFR BLD AUTO: 27.8 %
MCH RBC QN AUTO: 31.6 PG (ref 26.5–33)
MCHC RBC AUTO-ENTMCNC: 32.6 G/DL (ref 31.5–36.5)
MCV RBC AUTO: 97 FL (ref 78–100)
MONOCYTES # BLD AUTO: 0.7 10E9/L (ref 0–1.3)
MONOCYTES NFR BLD AUTO: 11 %
NEUTROPHILS # BLD AUTO: 3.5 10E9/L (ref 1.6–8.3)
NEUTROPHILS NFR BLD AUTO: 58.5 %
NRBC # BLD AUTO: 0 10*3/UL
NRBC BLD AUTO-RTO: 0 /100
PLATELET # BLD AUTO: 370 10E9/L (ref 150–450)
POTASSIUM SERPL-SCNC: NORMAL MMOL/L (ref 3.4–5.3)
RBC # BLD AUTO: 3.16 10E12/L (ref 3.8–5.2)
SODIUM SERPL-SCNC: NORMAL MMOL/L (ref 133–144)
WBC # BLD AUTO: 5.9 10E9/L (ref 4–11)

## 2020-03-26 PROCEDURE — G0378 HOSPITAL OBSERVATION PER HR: HCPCS

## 2020-03-26 PROCEDURE — 25000128 H RX IP 250 OP 636

## 2020-03-26 PROCEDURE — 99219 ZZC INITIAL OBSERVATION CARE,LEVL II: CPT | Performed by: INTERNAL MEDICINE

## 2020-03-26 PROCEDURE — 93971 EXTREMITY STUDY: CPT | Mod: LT

## 2020-03-26 PROCEDURE — 99285 EMERGENCY DEPT VISIT HI MDM: CPT | Mod: 25

## 2020-03-26 PROCEDURE — 99207 ZZC CDG-CODE CATEGORY CHANGED: CPT | Performed by: INTERNAL MEDICINE

## 2020-03-26 PROCEDURE — 25000128 H RX IP 250 OP 636: Performed by: INTERNAL MEDICINE

## 2020-03-26 PROCEDURE — 80048 BASIC METABOLIC PNL TOTAL CA: CPT | Performed by: EMERGENCY MEDICINE

## 2020-03-26 PROCEDURE — 96374 THER/PROPH/DIAG INJ IV PUSH: CPT

## 2020-03-26 PROCEDURE — 82565 ASSAY OF CREATININE: CPT

## 2020-03-26 PROCEDURE — 82272 OCCULT BLD FECES 1-3 TESTS: CPT | Performed by: EMERGENCY MEDICINE

## 2020-03-26 PROCEDURE — 85025 COMPLETE CBC W/AUTO DIFF WBC: CPT | Performed by: EMERGENCY MEDICINE

## 2020-03-26 RX ORDER — HYDROCODONE BITARTRATE AND ACETAMINOPHEN 5; 325 MG/1; MG/1
1-2 TABLET ORAL EVERY 4 HOURS PRN
Status: DISCONTINUED | OUTPATIENT
Start: 2020-03-26 | End: 2020-03-27

## 2020-03-26 RX ORDER — NALOXONE HYDROCHLORIDE 0.4 MG/ML
.1-.4 INJECTION, SOLUTION INTRAMUSCULAR; INTRAVENOUS; SUBCUTANEOUS
Status: DISCONTINUED | OUTPATIENT
Start: 2020-03-26 | End: 2020-03-27 | Stop reason: HOSPADM

## 2020-03-26 RX ORDER — LIDOCAINE 40 MG/G
CREAM TOPICAL
Status: DISCONTINUED | OUTPATIENT
Start: 2020-03-26 | End: 2020-03-27 | Stop reason: HOSPADM

## 2020-03-26 RX ORDER — HEPARIN SODIUM 10000 [USP'U]/100ML
750 INJECTION, SOLUTION INTRAVENOUS CONTINUOUS
Status: DISCONTINUED | OUTPATIENT
Start: 2020-03-26 | End: 2020-03-27

## 2020-03-26 RX ORDER — ACETAMINOPHEN 650 MG/1
650 SUPPOSITORY RECTAL EVERY 4 HOURS PRN
Status: DISCONTINUED | OUTPATIENT
Start: 2020-03-26 | End: 2020-03-27 | Stop reason: HOSPADM

## 2020-03-26 RX ORDER — AMOXICILLIN 250 MG
2 CAPSULE ORAL 2 TIMES DAILY
Status: DISCONTINUED | OUTPATIENT
Start: 2020-03-26 | End: 2020-03-27 | Stop reason: HOSPADM

## 2020-03-26 RX ORDER — CALCIUM CARBONATE 500 MG/1
1 TABLET, CHEWABLE ORAL
COMMUNITY
End: 2024-01-02

## 2020-03-26 RX ORDER — ONDANSETRON 4 MG/1
4 TABLET, ORALLY DISINTEGRATING ORAL EVERY 6 HOURS PRN
Status: DISCONTINUED | OUTPATIENT
Start: 2020-03-26 | End: 2020-03-27 | Stop reason: HOSPADM

## 2020-03-26 RX ORDER — ONDANSETRON 2 MG/ML
4 INJECTION INTRAMUSCULAR; INTRAVENOUS EVERY 6 HOURS PRN
Status: DISCONTINUED | OUTPATIENT
Start: 2020-03-26 | End: 2020-03-27 | Stop reason: HOSPADM

## 2020-03-26 RX ORDER — ACETAMINOPHEN 325 MG/1
650 TABLET ORAL EVERY 4 HOURS PRN
Status: DISCONTINUED | OUTPATIENT
Start: 2020-03-26 | End: 2020-03-27 | Stop reason: HOSPADM

## 2020-03-26 RX ORDER — PAROXETINE 20 MG/1
20 TABLET, FILM COATED ORAL AT BEDTIME
COMMUNITY

## 2020-03-26 RX ORDER — AMOXICILLIN 250 MG
1 CAPSULE ORAL 2 TIMES DAILY
Status: DISCONTINUED | OUTPATIENT
Start: 2020-03-26 | End: 2020-03-27 | Stop reason: HOSPADM

## 2020-03-26 RX ORDER — BISACODYL 10 MG
10 SUPPOSITORY, RECTAL RECTAL DAILY PRN
Status: DISCONTINUED | OUTPATIENT
Start: 2020-03-26 | End: 2020-03-27 | Stop reason: HOSPADM

## 2020-03-26 RX ADMIN — HEPARIN SODIUM 800 UNITS/HR: 10000 INJECTION, SOLUTION INTRAVENOUS at 23:29

## 2020-03-26 RX ADMIN — Medication 3000 UNITS: at 23:26

## 2020-03-27 VITALS
RESPIRATION RATE: 16 BRPM | TEMPERATURE: 98.1 F | OXYGEN SATURATION: 96 % | SYSTOLIC BLOOD PRESSURE: 105 MMHG | HEART RATE: 95 BPM | DIASTOLIC BLOOD PRESSURE: 48 MMHG

## 2020-03-27 PROBLEM — I82.419 ACUTE DEEP VEIN THROMBOSIS (DVT) OF FEMORAL VEIN (H): Status: RESOLVED | Noted: 2020-03-26 | Resolved: 2020-03-27

## 2020-03-27 PROBLEM — I82.409 DVT (DEEP VENOUS THROMBOSIS) (H): Status: ACTIVE | Noted: 2020-03-26

## 2020-03-27 PROBLEM — D63.8 ANEMIA OF CHRONIC DISEASE: Status: ACTIVE | Noted: 2020-03-27

## 2020-03-27 PROBLEM — O22.30 DVT (DEEP VEIN THROMBOSIS) IN PREGNANCY: Status: RESOLVED | Noted: 2020-03-26 | Resolved: 2020-03-27

## 2020-03-27 LAB
ANION GAP SERPL CALCULATED.3IONS-SCNC: 2 MMOL/L (ref 3–14)
BUN SERPL-MCNC: 10 MG/DL (ref 7–30)
CALCIUM SERPL-MCNC: 8.6 MG/DL (ref 8.5–10.1)
CHLORIDE SERPL-SCNC: 108 MMOL/L (ref 94–109)
CO2 SERPL-SCNC: 28 MMOL/L (ref 20–32)
CREAT SERPL-MCNC: 0.67 MG/DL (ref 0.52–1.04)
ERYTHROCYTE [DISTWIDTH] IN BLOOD BY AUTOMATED COUNT: 13.2 % (ref 10–15)
GFR SERPL CREATININE-BSD FRML MDRD: 88 ML/MIN/{1.73_M2}
GLUCOSE SERPL-MCNC: 108 MG/DL (ref 70–99)
HCT VFR BLD AUTO: 29.3 % (ref 35–47)
HGB BLD-MCNC: 9.5 G/DL (ref 11.7–15.7)
IRON SATN MFR SERPL: 27 % (ref 15–46)
IRON SERPL-MCNC: 74 UG/DL (ref 35–180)
LMWH PPP CHRO-ACNC: 0.82 IU/ML
MCH RBC QN AUTO: 31.3 PG (ref 26.5–33)
MCHC RBC AUTO-ENTMCNC: 32.4 G/DL (ref 31.5–36.5)
MCV RBC AUTO: 96 FL (ref 78–100)
PLATELET # BLD AUTO: 340 10E9/L (ref 150–450)
POTASSIUM SERPL-SCNC: 3.8 MMOL/L (ref 3.4–5.3)
RBC # BLD AUTO: 3.04 10E12/L (ref 3.8–5.2)
SODIUM SERPL-SCNC: 138 MMOL/L (ref 133–144)
TIBC SERPL-MCNC: 276 UG/DL (ref 240–430)
WBC # BLD AUTO: 5.1 10E9/L (ref 4–11)

## 2020-03-27 PROCEDURE — 96366 THER/PROPH/DIAG IV INF ADDON: CPT

## 2020-03-27 PROCEDURE — 85027 COMPLETE CBC AUTOMATED: CPT | Performed by: INTERNAL MEDICINE

## 2020-03-27 PROCEDURE — 36415 COLL VENOUS BLD VENIPUNCTURE: CPT | Performed by: INTERNAL MEDICINE

## 2020-03-27 PROCEDURE — 25000132 ZZH RX MED GY IP 250 OP 250 PS 637: Mod: GY | Performed by: INTERNAL MEDICINE

## 2020-03-27 PROCEDURE — 85520 HEPARIN ASSAY: CPT | Performed by: INTERNAL MEDICINE

## 2020-03-27 PROCEDURE — 80048 BASIC METABOLIC PNL TOTAL CA: CPT | Performed by: INTERNAL MEDICINE

## 2020-03-27 PROCEDURE — G0378 HOSPITAL OBSERVATION PER HR: HCPCS

## 2020-03-27 PROCEDURE — 83540 ASSAY OF IRON: CPT | Performed by: INTERNAL MEDICINE

## 2020-03-27 PROCEDURE — 83550 IRON BINDING TEST: CPT | Performed by: INTERNAL MEDICINE

## 2020-03-27 PROCEDURE — 25000128 H RX IP 250 OP 636: Performed by: INTERNAL MEDICINE

## 2020-03-27 PROCEDURE — 99217 ZZC OBSERVATION CARE DISCHARGE: CPT | Performed by: INTERNAL MEDICINE

## 2020-03-27 RX ORDER — ACETAMINOPHEN 325 MG/1
650 TABLET ORAL EVERY 4 HOURS PRN
Refills: 0 | Status: ON HOLD
Start: 2020-03-27 | End: 2024-01-04

## 2020-03-27 RX ADMIN — HEPARIN SODIUM 750 UNITS/HR: 10000 INJECTION, SOLUTION INTRAVENOUS at 07:35

## 2020-03-27 RX ADMIN — APIXABAN 10 MG: 5 TABLET, FILM COATED ORAL at 10:25

## 2020-03-27 RX ADMIN — ACETAMINOPHEN 325 MG: 325 TABLET, FILM COATED ORAL at 08:47

## 2020-03-27 NOTE — PLAN OF CARE
Alert to self, baseline dementia. VSS on RA. SBA. Regular diet. Heparin infusion stopped after pt given Eliquis. Heparin 10a 0.82. Denies pain, n/t, nausea, SOB. IV removed. LLE edema, red, CMS intact. Memory care packet and discharge med given to pt and [facility] transport. Belongings sent w/ pt. [facility] transporting pt to Memory Care Facility.

## 2020-03-27 NOTE — ED NOTES
Bed: ED08  Expected date:   Expected time:   Means of arrival:   Comments:  [place] 74F possible left leg DVT

## 2020-03-27 NOTE — DISCHARGE SUMMARY
[facility]    Discharge Summary  Hospitalist    Date of Admission:  3/26/2020  Date of Discharge:  3/27/2020  Discharging Provider: Surya Chahal MD    Discharge Diagnoses   Principal Problem:    Acute Left Fem Vein DVT      Guaiac Positive -- but clinically no evidence of gross GI bleeding     Active Problems:    COPD (chronic obstructive pulmonary disease)       Hyperlipidemia LDL goal <130      Protein-calorie malnutrition       Advanced Dementia without Behaviors       Probable Anemia of chronic disease -- iron studies normal       History of Present Illness   73 yo female with advanced dementia who has been living at [facility] since April 2019, who presents with several days of left leg swelling with slight redness, and appears to have pain with walking.  Patient is unable to give any history, her sister reports that her other sister has had a DVT, but she is unaware other family hx of DVT, and not aware of any testing for genetic predisposition to clotting.  The patient is sedentary, spends more of her time in bed, and no hx of trauma or personal hx of cancer or prior clotting.      In ER, vital signs stable, and O2 sat 99% on room air. Hgb 10.0 with MCV 97, and last hgb was 11.6 with MCV of 100 on 3/9/18 (2 yrs ago).  No reported hx of melena.  Rectal exam in ER with light brown stool but did test guaiac positive.  Left leg US showed acute left femoral occlusion with thrombus.      Hospital Course   Admitted to observation, started on IV heparin given concern about possible chronic GI bleed.  Serum Iron 74 with TIBC 176 with 27% saturation -- suggests anemia is probably anemia of chornic disease, and suspect guaiac positive may have been false positive related to trauma of doing a rectal exam.      No melena observed here, will start Eliquis 10 mg bid twice daily for 1 week, then 5 mg bid for 6 months, then reassess stopping vs chronic anticoagulation.   Suspect this DVT related to being sedentary, but would watch for signs of malignancy (discussed sister), and will repeat Hgb in 5 days to monitor anemia.  Did discuss code status with sister, Shawna who is POA, and she is in agreement with DNR/DNI code status.      Surya Chahal MD, MD  Pager: [phone]  Cell Phone:  [phone]       Significant Results and Procedures   As above    Pending Results   These results will be followed up by Dr. Chahal  Unresulted Labs Ordered in the Past 30 Days of this Admission     No orders found for last 31 day(s).          Code Status   DNR / DNI       Primary Care Physician   Agnes Montejo    Physical Exam   Temp: 98.1  F (36.7  C) Temp src: Oral BP: 105/48 Pulse: 95   Resp: 16 SpO2: 96 % O2 Device: None (Room air)    Vitals:     Vital Signs with Ranges  Temp:  [96.2  F (35.7  C)-98.4  F (36.9  C)] 98.1  F (36.7  C)  Pulse:  [67-95] 95  Resp:  [16] 16  BP: (102-113)/(32-58) 105/48  SpO2:  [94 %-100 %] 96 %  No intake/output data recorded.    Exam on discharge:   Pleasant, minimal speech, smiles  Left leg with moderate lower leg swelling and mild erythema and tenderness.     Discharge Disposition   Discharged to back to [facility] assisted living  Condition at discharge: Fair    Consultations This Hospital Stay   PHARMACY TO DOSE HEPARIN  SOCIAL WORK IP CONSULT  PHARMACY TO DOSE HEPARIN  PHARMACY LIAISON FOR MEDICATION COVERAGE CONSULT  CARE TRANSITION RN/SW IP CONSULT    Time Spent on this Encounter   I spent a total of 25 minutes discharging this patient.     Discharge Orders      Reason for your hospital stay    Acute blood clot in left leg with associated leg swelling, and chronic anemia.     Follow-up and recommended labs and tests     Follow up with provide at assisted living in 5 days, and check Hgb in 5 days.     Activity    Your activity upon discharge: limited walking for 1 week, then no restrictions with activity after that.     Discharge  Instructions    Call Dr. Boles if any medical questions at Cell Phone [phone].     Discharge Instructions    Elevate left leg while in bed for the next 5 days to help decrease swelling.     DNR/DNI     Diet    Follow this diet upon discharge: Orders Placed This Encounter      Regular Diet Adult     Discharge Medications   Current Discharge Medication List      START taking these medications    Details   acetaminophen (TYLENOL) 325 MG tablet Take 2 tablets (650 mg) by mouth every 4 hours as needed for mild pain  Qty:  , Refills: 0    Associated Diagnoses: Acute deep vein thrombosis (DVT) of femoral vein of left lower extremity (H)      apixaban ANTICOAGULANT (ELIQUIS) 5 MG tablet 10 mg bid for 1 week, then 5 mg bid for 6 months -- then reassess whether to continue -- for treatment of Left DVT 3/26/20  Qty: 60 tablet, Refills: 3    Comments: Future refills by PCP Dr. Agnes Montejo with phone number [phone].  Associated Diagnoses: Acute deep vein thrombosis (DVT) of femoral vein of left lower extremity (H)         CONTINUE these medications which have NOT CHANGED    Details   calcium carbonate (TUMS) 500 MG chewable tablet Take 1 chew tab by mouth every 2 hours as needed for heartburn      PARoxetine (PAXIL) 20 MG tablet Take 20 mg by mouth At Bedtime           Allergies   Allergies   Allergen Reactions     Penicillins      Data   Most Recent 3 CBC's:  Recent Labs   Lab Test 03/27/20 0622 03/26/20 2119 03/09/18  1500   WBC 5.1 5.9 8.2   HGB 9.5* 10.0* 11.6*   MCV 96 97 100    370 284      Most Recent 3 BMP's:  Recent Labs   Lab Test 03/27/20 0622 03/26/20 2119 03/09/18  1500    Canceled, Test credited 136   POTASSIUM 3.8 Canceled, Test credited 4.2   CHLORIDE 108 Canceled, Test credited 100   CO2 28 Canceled, Test credited 31   BUN 10 Canceled, Test credited 9   CR 0.67 Canceled, Test credited 0.60   ANIONGAP 2* Canceled, Test credited 5   DAYA 8.6 Canceled, Test credited 8.7   *  Canceled, Test credited 80     Most Recent 2 LFT's:  Recent Labs   Lab Test 03/09/18  1500 05/09/14  1404   AST 25 29   ALT 18 22   ALKPHOS 62 82   BILITOTAL 0.2 0.4     Most Recent INR's and Anticoagulation Dosing History:  Anticoagulation Dose History     There is no flowsheet data to display.        Most Recent 3 Troponin's:No lab results found.  Most Recent Cholesterol Panel:  Recent Labs   Lab Test 05/09/14  1404   CHOL 216*   LDL 92      TRIG 66     Most Recent 6 Bacteria Isolates From Any Culture (See EPIC Reports for Culture Details):  Recent Labs   Lab Test 03/09/18  1500 10/29/13  1258   CULT No growth No Beta Streptococcus isolated     Most Recent TSH, T4 and A1c Labs:  Recent Labs   Lab Test 03/09/18  1500   TSH 1.27

## 2020-03-27 NOTE — CONSULTS
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  SW consulted to assist with discharge planning, emotional support and transportation arrangements.  Pt is a 74 yr old female who transferred from the ED to Observation on 3/26/20 for DVT concerns. Pt lives at [facility] unit and receives full cares. SW attempted to reach patient's sister Shawna, with no answer and no personalized VM. Left generic msg for sister to call this writer back. SW spoke to RN Nat at facility who stated no concerns about patient returning other than she needs to be back by early afternoon today. If not back early afternoon, pt will need to stay until Monday.   PLAN  Financial costs for the patient includes N/A .  Patient/family is agreeable to the plan?  Patient not decisional. Message left for sister.  Transportation/person available to transport on day of discharge  is [facility] and have they been notified/set up 1230.  Patient Goals and Preferences: Return to [facility].  Patient anticipates discharging to:  jail .    ADDENDUM: 1203: PAUL received VM from Prashant Wolff  ([phone]) requesting call back with update on patient status. SW left VM back, providing verbal update and informed that patient is returning to [facility] this afternoon. Provided contact info should there be additional questions.    LORETO Nielsen  Daytime (8:00am-4:30pm): [phone]  After-Hours PAUL Pager  (4:30pm-11:30pm): [phone]

## 2020-03-27 NOTE — ED TRIAGE NOTES
Pt presents from [facility] facility w/concern for DVT in L leg. Pt was uncooperative with US there, so they sent her here for workup per EMS.

## 2020-03-27 NOTE — CONSULTS
$0 copay for Eliquis or Xarelto.    [facility]  [facility] Liaison  Liaison Cell: [phone]

## 2020-03-27 NOTE — ED NOTES
[facility]  ED Nurse Handoff Report    ED Chief complaint: Leg Pain      ED Diagnosis:   Final diagnoses:   Acute deep vein thrombosis (DVT) of distal vein of left lower extremity (H)   Anemia, unspecified type       Code Status: Full Code    Allergies:   Allergies   Allergen Reactions     Penicillins        Patient Story: leg pain  Focused Assessment:  74 year old female with a history of dementia who presents to the Emergency Department via EMS for evaluation of leg pain. The patient comes from a memory care unit where staff noticed the patient's left lower leg to be swollen and red. They were concerned for possible DVT and attempted to perform ultrasound there. However, the patient was combative, so EMS was called to transfer the patient to the ED for evaluation. There was no known trauma.  Pt gets up and out of bed. Confused.      Treatments and/or interventions provided: heparin  Patient's response to treatments and/or interventions: stable    To be done/followed up on inpatient unit:  monitor    Does this patient have any cognitive concerns?: Baseline dementia    Activity level - Baseline/Home:  Independent  Activity Level - Current:   Stand with Assist    Patient's Preferred language: English   Needed?: No    Isolation: None  Infection: Not Applicable  Bariatric?: No    Vital Signs:   Vitals:    03/26/20 2145 03/26/20 2230 03/26/20 2245 03/26/20 2300   BP:  102/52  106/52   Pulse:  72  71   Resp:       Temp:       TempSrc:       SpO2: 100% 98% 98% 99%       Cardiac Rhythm:     Was the PSS-3 completed:   No -x  What interventions are required if any?               Family Comments: self  OBS brochure/video discussed/provided to patient/family: N/A              Name of person given brochure if not patient: x              Relationship to patient: x    For the majority of the shift this patient's behavior was Green.   Behavioral interventions performed were x.    ED NURSE PHONE NUMBER:  [phone]

## 2020-03-27 NOTE — H&P
[facility]    History and Physical  Hospitalist       Date of Admission:  3/26/2020    Assessment & Plan   Mariah Winslow is a 74 year old female with advanced dementia brought in by medic from memory care unit for lower extremity swelling.  Diagnosed with a DVT.    Active Problems:    Acute deep vein thrombosis (DVT) of femoral vein (H)  Hemoccult positive  --- Patient has advanced dementia, she was found to have a left lower extremity edema at the memory care unit and was sent to the hospital by EMS for further evaluation, ultrasound shows extensive DVT, interventional radiology was consulted, so far the plan is to just continue anticoagulation, will start on heparin IV as the patient had mild change in her hemoglobin, Hemoccult was done in the ED and due to that will start on IV heparin and monitor overnight, if hemoglobin is stable she can be transitioned into 1 of the newer anticoagulant versus Coumadin, as per her polst she is full code, will have to discuss with her family further, her sister is the medical power of ,need to discuss regarding her anticoagulation choice depending on her hemoglobin tomorrow.  I have not consulted vascular medicine for this, consult if needed.    COPD (chronic obstructive pulmonary disease) (H)  --continue prn albuterol     Hyperlipidemia LDL goal <130    Protein-calorie malnutrition (H)  --not on medications     Dementia without behavioral disturbance, unspecified dementia type (H)  ---advanced dementia ,lives in a memory care unit         DVT Prophylaxis: Pneumatic Compression Devices  Code Status: not able to obtain for now    Disposition: Expected discharge in 24 hours    Natalee Nazario MD    Primary Care Physician   Agnes Montejo    Chief Complaint   Leg swelling 1 day     History  obtained from medical records and ER physician    History of Present Illness   Mariah Winslow is a 74 year old female with advanced dementia presents to the  emergency department brought in by EMS due to her left lower extremity edema, they had noticed in the nursing home over the last few days of increasing lower extremity edema, patient is resting in room air and does not have any oxygen needs, PE study was not done in the emergency department due to very low suspicion, patient is not on any medications at the memory care unit, she is but oriented only to self.  Her labs included a hemoglobin which was 10.5 which is slightly lower than before, Hemoccult was done in the ED due to concern of starting anticoagulation and it was mildly positive.  For now the plan is to start her on heparin drip and monitor overnight for any hemoglobin changes, if no bleeding can transition her to 1 of the newer anticoagulants versus Coumadin.  Her commitment sister is the medical power of , her.  States she is full code, since it was late in the night I did not contact her sister for further clarification, will have to do that in a.m.  Patient is nonverbal at this point, system  evaluation done but no response obtained.    Past Medical History    I have reviewed this patient's medical history and updated it with pertinent information if needed.   Past Medical History:   Diagnosis Date     Dementia (H)      Major depressive disorder, recurrent episode, moderate (H) 4/29/2014       Past Surgical History   I have reviewed this patient's surgical history and updated it with pertinent information if needed.  Past Surgical History:   Procedure Laterality Date     APPENDECTOMY  1982     OOPHORECTOMY Left 1982       Prior to Admission Medications   Prior to Admission Medications   Prescriptions Last Dose Informant Patient Reported? Taking?   ibuprofen 200 MG capsule   Yes No   Sig: Take 200 mg by mouth every 4 hours as needed for fever      Facility-Administered Medications: None     Allergies   Allergies   Allergen Reactions     Penicillins        Social History   I have reviewed this  patient's social history and updated it with pertinent information if needed. Mariah Winslow  reports that she has been smoking. She has a 52.50 pack-year smoking history. She has never used smokeless tobacco. She reports current alcohol use. She reports that she does not use drugs.    Family History   I have reviewed this patient's family history and updated it with pertinent information if needed.   Family History   Problem Relation Age of Onset     Diabetes Maternal Aunt      Hypertension Sister      Myocardial Infarction Maternal Uncle      Myocardial Infarction Paternal Uncle      Heart Disease Sister 62        Stent placed       Review of Systems   The 10 point Review of Systems is negative other than noted in the HPI or here.     Physical Exam   Temp: 98.4  F (36.9  C) Temp src: Oral BP: 107/52 Pulse: 69   Resp: 16 SpO2: 100 % O2 Device: None (Room air)    Vital Signs with Ranges  Temp:  [98.4  F (36.9  C)] 98.4  F (36.9  C)  Pulse:  [67-69] 69  Resp:  [16] 16  BP: (102-109)/(49-58) 107/52  SpO2:  [94 %-100 %] 100 %  0 lbs 0 oz    Constitutional: Awake, alert, cooperative, no apparent distress.  Eyes: Conjunctiva and pupils examined and normal.  HEENT: Moist mucous membranes, normal dentition.  Respiratory: Clear to auscultation bilaterally, no crackles or wheezing.  Cardiovascular: Regular rate and rhythm, normal S1 and S2, and no murmur noted.  GI: Soft, non-distended, non-tender, normal bowel sounds.  Lymph/Hematologic: No anterior cervical or supraclavicular adenopathy.  Skin: Edema present on left lower extremity  Musculoskeletal: Inferior and edema noted on left lower extremity.  Neurologic: Cranial nerves 2-12 intact, normal strength and sensation.  Psychiatric: Patient is only oriented to self    Data   Data reviewed today:    Recent Labs   Lab 03/26/20  2119   WBC 5.9   HGB 10.0*   MCV 97      NA Canceled, Test credited   POTASSIUM Canceled, Test credited   CHLORIDE Canceled, Test credited    CO2 Canceled, Test credited   BUN Canceled, Test credited   CR Canceled, Test credited   ANIONGAP Canceled, Test credited   DAYA Canceled, Test credited   GLC Canceled, Test credited       Imaging:  Recent Results (from the past 24 hour(s))   US Lower Extremity Venous Duplex Left    Narrative    US LEFT LOWER EXTREMITY VENOUS DUPLEX ULTRASOUND   3/26/2020 9:04 PM     HISTORY: Pain and edema.    COMPARISON: None available    FINDINGS: Gray-scale, color and Doppler spectral analysis ultrasound  was performed of the left lower extremity. Compression and  augmentation imaging was performed.    The distal aspect of the left femoral and popliteal veins are  noncompressible. The posterior tibial veins only partially  compressible from the proximal calf down to the ankle. Other left  lower extremity veins are fully compressible.       Impression    IMPRESSION:   1. Occlusive deep venous thrombosis involving the distal aspect of the  left femoral vein and extends into the left popliteal vein.  2. Nonocclusive deep venous thrombosis in the posterior tibial vein  from the proximal calf down to the ankle. Other left lower extremity  veins are patent.     ILIR WALKER MD

## 2020-03-27 NOTE — PROGRESS NOTES
Observation goals  PRIOR TO DISCHARGE      Comments: -diagnostic tests and consults completed and resulted -not met.  -vital signs normal or at patient baseline -met.  Nurse to notify provider when observation goals have been met and patient is ready for discharge.           
 Observation goals  PRIOR TO DISCHARGE      Comments: -diagnostic tests and consults completed and resulted -not met.  -vital signs normal or at patient baseline -met.  Nurse to notify provider when observation goals have been met and patient is ready for discharge.         Patient from Memory care unit, arrived around 0000. Alert to self, hesitant with speech but calm and cooperative. Up with assist of 1, GB. VSS on room air, soft BP. Denies pain. LLE edematous and redness noted, pulses intact. Pt stated not pain but some numbness. PIV infusing heparin at 8 ml/hr, hep 10a at 0630, result pending. No plans for surgery per vascular so far, discharge plan pending.    
Discharge Planner   Discharge Plans in progress: Return to [facility] unit  Barriers to discharge plan: none identified  Follow up plan:   PAUL spoke to Nat RN at [facility] who approved patient returning today and requested that we fill pt's Eliquis here.   PAUL epaged MD with request to fill Eliquis med here.  PAUL called pt's sister Shawna. No answer. No personal VM msg. Left generic msg for sister to call this writer.  PAUL scheduled [facility] w/c transport.  PAUL updated medical team.  PAUL informed Nat at [facility] ([phone]) of pt transport time and confirmed that pt lives in the [facility] at [address] [facility] [address]. Nta informed that the building code is: [id number]  PAUL updated [facility] of building address and building code.    DC orders: faxed via DOD at 1043  Scripts: Eliquis being filled and sent with patient  Trans: [facility] at 1230    LORETO Nielsen  Daytime (8:00am-4:30pm): [phone]  After-Hours  Pager (4:30pm-11:30pm): [phone]              Entered by: Valerie Alex 03/27/2020 10:26 AM         
RECEIVING UNIT ED HANDOFF REVIEW    ED Nurse Handoff Report was reviewed by: Eva Ward RN on March 26, 2020 at 11:35 PM       
yes

## 2020-03-27 NOTE — ED PROVIDER NOTES
History     Chief Complaint:  Leg Pain    HPI - History limited secondary to patient's dementia.     Mariah Winslow is a 74 year old female with a history of dementia who presents to the Emergency Department via EMS for evaluation of leg pain. The patient comes from a memory care unit where staff noticed the patient's left lower leg to be swollen and red. They were concerned for possible DVT and attempted to perform ultrasound there. However, the patient was combative, so EMS was called to transfer the patient to the ED for evaluation. There was no known trauma.    Allergies:  Penicillins    Medications:    Paroxetine    Past Medical History:    Anxiety  COPD (chronic obstructive pulmonary disease)  Dementia  Depression  Hyperlipidemia    Past Surgical History:    Appendectomy  Oophorectomy    Family History:    Heart disease  Hypertension    Social History:  The patient presents to the ED via EMS.  Marital status:   Smoking status: Current Every Day Smoker; 1.5 pack/day  Alcohol use: Yes  Drug use: No  PCP: Agnes Montejo    Review of Systems   Unable to perform ROS: Dementia     Physical Exam     Patient Vitals for the past 24 hrs:   BP Temp Temp src Pulse Resp SpO2 Weight   03/26/20 2357 107/41 96.2  F (35.7  C) Oral -- 16 98 % --   03/26/20 2340 106/57 -- -- 71 16 98 % --   03/26/20 2300 106/52 -- -- 71 -- 99 % --   03/26/20 2245 -- -- -- -- -- 98 % --   03/26/20 2230 102/52 -- -- 72 -- 98 % --   03/26/20 2145 -- -- -- -- -- 100 % --   03/26/20 2130 107/52 -- -- 69 -- 100 % --   03/26/20 2115 -- -- -- -- -- 97 % --   03/26/20 2100 102/56 -- -- 68 -- 94 % --   03/26/20 2000 109/49 -- -- 67 -- 97 % --   03/26/20 1946 105/58 98.4  F (36.9  C) Oral 69 16 99 % --       Physical Exam  General: Alert and cooperative with exam. Patient in mild distress.  Baseline mentation; significant dementia  Head:  Scalp is NC/AT  Eyes:  No scleral icterus, PERRL  ENT:  The external nose and ears are normal. The  oropharynx is normal and without erythema; mucus membranes are moist.   Neck:  Normal range of motion without rigidity.  CV:  Regular rate and rhythm  Resp:  Breath sounds are clear bilaterally    Non-labored, no retractions or accessory muscle use  GI:  Abdomen is soft, no distension, no tenderness. No peritoneal signs.  Rectal exam demonstrates normal tone with light brown stool; Hemoccult positive.  MS:  LLE: CMS intact.  No asymmetric swelling and erythema as pictured below without significant overlying warmth or evidence of cellulitis  Skin:  Warm and dry, No rash or lesions noted.  Neuro: Oriented x 3. No gross motor deficits.      Emergency Department Course     Imaging:  Radiographic findings were communicated with the patient's care staff who voiced understanding of the findings.    US Lower Extremity Venous Duplex Left  1. Occlusive deep venous thrombosis involving the distal aspect of the   left femoral vein and extends into the left popliteal vein.   2. Nonocclusive deep venous thrombosis in the posterior tibial vein   from the proximal calf down to the ankle. Other left lower extremity   veins are patent.   As read by Radiology.    Laboratory:   CBC: HGB 10.0 (L) o/w WNL (WBC 5.9, )  Creatinine POCT (Collected 2156): Creatinine 0.6, GFR >90  Occult Blood stool: Positive     Emergency Department Course:  The patient arrived in the emergency department via EMS.    Past medical records, nursing notes, and vitals reviewed.  2113: I performed an exam of the patient and obtained history, as documented above.   The patient was sent for a lower extremity ultrasound while in the emergency department, findings above.  IV inserted and blood samples were collected and sent for laboratory testing, findings above.    2132: I spoke to the patient's sister, her power of , and updated her on the findings.    2143: I spoke with Dr. Moses of interventional radiology    2214: Findings and plan explained  to the sister who consents to admission.     2221: Discussed the patient with Dr. Nazario, who will admit the patient to an observation unit bed for further monitoring, evaluation, and treatment.     Impression & Plan    Medical Decision Making:  Mariah Winslow is a 74-year-old female who presents with left lower extremity asymmetric leg swelling and redness.  Patient's medical history and records were reviewed.  Initial consideration for, but not limited to, DVT, infectious process, soft tissue injury, among others.  Ultrasound demonstrates DVT as noted above.  Given that DVT does extend into the distal femoral vein, case was discussed with Dr. Cornell of interventional radiology who recommended anticoagulation only without intervention at this time.  Patient's CBC demonstrated mild anemia (hemoglobin 10.0; decreased from baseline).  Rectal exam demonstrates light brown stool that is Hemoccult positive.  Given need for anticoagulation patient will be admitted to observation with the hospitalist service.  Heparin drip initiated.  Remained stable throughout ED course.  Presentation consistent with DVT.    Diagnosis:    ICD-10-CM    1. Acute deep vein thrombosis (DVT) of distal vein of left lower extremity (H)  I82.4Z2    2. Anemia, unspecified type  D64.9        Disposition:  Admitted to observation unit  3/26/2020    EMERGENCY DEPARTMENT    Scribe Disclosure:  I, Gabby Barrientos, am serving as a scribe at 8:04 PM on 3/26/2020 to document services personally performed by Andrew Rubi DO  based on my observations and the provider's statements to me.     IRa, am serving as a scribe at 9:34 PM on 3/26/2020 to document services personally performed by Andrew Rubi DO based on my observations and the provider's statements to me.          Andrew Rubi DO  03/27/20 0009

## 2020-03-30 ENCOUNTER — TELEPHONE (OUTPATIENT)
Dept: FAMILY MEDICINE | Facility: CLINIC | Age: 75
End: 2020-03-30

## 2020-03-30 NOTE — TELEPHONE ENCOUNTER
Please contact patient for In-patient follow up.  [phone] (home)     Visit date:  03 27 2020   Diagnosis listed:Acute Deep Vein Thrombosis (Dvt) Of Distal Vein Of Left Lower Extremity (H), Acute Deep Vein Thrombosis (Dvt) Of Fem  Number of visits in past 12 months:1/0

## 2020-03-30 NOTE — TELEPHONE ENCOUNTER
Pt last ov with Rossi Montejo was 11/9/18.  Pt has a hx of advanced dementia - states in discharge summary - she is to f/u with provider at [facility].  Unable to find number to memory care to verify the pt was going to be following up with provider that is in house there.

## 2020-03-31 NOTE — TELEPHONE ENCOUNTER
Letty (goes by Shawna) calls about her sister Mariah Winslow.      She does see a doctor at the Assisted Living.

## 2020-04-10 ENCOUNTER — DOCUMENTATION ONLY (OUTPATIENT)
Dept: OTHER | Facility: CLINIC | Age: 75
End: 2020-04-10

## 2022-08-15 NOTE — TELEPHONE ENCOUNTER
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 Plan: The patient has retinoschisis. Discussed with patient that retinoschisis can and does stay stable for years. We discussed that there is a small risk of progression/conversion to retinal detachment. Recommend close observation for now. We reviewed retinal detachment precautions, and patient knows to call ASAP with any changes.

## 2024-01-02 ENCOUNTER — HOSPITAL ENCOUNTER (INPATIENT)
Facility: CLINIC | Age: 79
LOS: 4 days | Discharge: NURSING, REHAB OR LONG-TERM CARE | DRG: 481 | End: 2024-01-06
Attending: EMERGENCY MEDICINE | Admitting: STUDENT IN AN ORGANIZED HEALTH CARE EDUCATION/TRAINING PROGRAM
Payer: MEDICARE

## 2024-01-02 ENCOUNTER — APPOINTMENT (OUTPATIENT)
Dept: CT IMAGING | Facility: CLINIC | Age: 79
DRG: 481 | End: 2024-01-02
Attending: EMERGENCY MEDICINE
Payer: MEDICARE

## 2024-01-02 ENCOUNTER — APPOINTMENT (OUTPATIENT)
Dept: GENERAL RADIOLOGY | Facility: CLINIC | Age: 79
DRG: 481 | End: 2024-01-02
Attending: EMERGENCY MEDICINE
Payer: MEDICARE

## 2024-01-02 DIAGNOSIS — S72.141A CLOSED DISPLACED INTERTROCHANTERIC FRACTURE OF RIGHT FEMUR, INITIAL ENCOUNTER (H): Primary | ICD-10-CM

## 2024-01-02 LAB
ABO/RH(D): NORMAL
ANION GAP SERPL CALCULATED.3IONS-SCNC: 9 MMOL/L (ref 7–15)
ANTIBODY SCREEN: NEGATIVE
ATRIAL RATE - MUSE: 102 BPM
BASOPHILS # BLD AUTO: 0 10E3/UL (ref 0–0.2)
BASOPHILS NFR BLD AUTO: 0 %
BUN SERPL-MCNC: 18.1 MG/DL (ref 8–23)
CALCIUM SERPL-MCNC: 9.3 MG/DL (ref 8.8–10.2)
CHLORIDE SERPL-SCNC: 104 MMOL/L (ref 98–107)
CREAT SERPL-MCNC: 0.68 MG/DL (ref 0.51–0.95)
DEPRECATED HCO3 PLAS-SCNC: 25 MMOL/L (ref 22–29)
DIASTOLIC BLOOD PRESSURE - MUSE: NORMAL MMHG
EGFRCR SERPLBLD CKD-EPI 2021: 90 ML/MIN/1.73M2
EOSINOPHIL # BLD AUTO: 0 10E3/UL (ref 0–0.7)
EOSINOPHIL NFR BLD AUTO: 0 %
ERYTHROCYTE [DISTWIDTH] IN BLOOD BY AUTOMATED COUNT: 13 % (ref 10–15)
GLUCOSE BLDC GLUCOMTR-MCNC: 136 MG/DL (ref 70–99)
GLUCOSE SERPL-MCNC: 142 MG/DL (ref 70–99)
HCT VFR BLD AUTO: 31.7 % (ref 35–47)
HGB BLD-MCNC: 10.8 G/DL (ref 11.7–15.7)
IMM GRANULOCYTES # BLD: 0.1 10E3/UL
IMM GRANULOCYTES NFR BLD: 1 %
INTERPRETATION ECG - MUSE: NORMAL
LYMPHOCYTES # BLD AUTO: 0.9 10E3/UL (ref 0.8–5.3)
LYMPHOCYTES NFR BLD AUTO: 6 %
MAGNESIUM SERPL-MCNC: 2 MG/DL (ref 1.7–2.3)
MCH RBC QN AUTO: 32 PG (ref 26.5–33)
MCHC RBC AUTO-ENTMCNC: 34.1 G/DL (ref 31.5–36.5)
MCV RBC AUTO: 94 FL (ref 78–100)
MONOCYTES # BLD AUTO: 0.9 10E3/UL (ref 0–1.3)
MONOCYTES NFR BLD AUTO: 6 %
NEUTROPHILS # BLD AUTO: 13.2 10E3/UL (ref 1.6–8.3)
NEUTROPHILS NFR BLD AUTO: 87 %
NRBC # BLD AUTO: 0 10E3/UL
NRBC BLD AUTO-RTO: 0 /100
P AXIS - MUSE: 63 DEGREES
PLATELET # BLD AUTO: 289 10E3/UL (ref 150–450)
POTASSIUM SERPL-SCNC: 4.9 MMOL/L (ref 3.4–5.3)
PR INTERVAL - MUSE: 144 MS
QRS DURATION - MUSE: 70 MS
QT - MUSE: 342 MS
QTC - MUSE: 445 MS
R AXIS - MUSE: 41 DEGREES
RBC # BLD AUTO: 3.38 10E6/UL (ref 3.8–5.2)
SODIUM SERPL-SCNC: 138 MMOL/L (ref 135–145)
SPECIMEN EXPIRATION DATE: NORMAL
SYSTOLIC BLOOD PRESSURE - MUSE: NORMAL MMHG
T AXIS - MUSE: 20 DEGREES
VENTRICULAR RATE- MUSE: 102 BPM
VIT D+METAB SERPL-MCNC: 31 NG/ML (ref 20–50)
WBC # BLD AUTO: 15 10E3/UL (ref 4–11)

## 2024-01-02 PROCEDURE — 36415 COLL VENOUS BLD VENIPUNCTURE: CPT | Performed by: PHYSICIAN ASSISTANT

## 2024-01-02 PROCEDURE — 86900 BLOOD TYPING SEROLOGIC ABO: CPT | Performed by: PHYSICIAN ASSISTANT

## 2024-01-02 PROCEDURE — 99223 1ST HOSP IP/OBS HIGH 75: CPT | Mod: AI | Performed by: STUDENT IN AN ORGANIZED HEALTH CARE EDUCATION/TRAINING PROGRAM

## 2024-01-02 PROCEDURE — 80048 BASIC METABOLIC PNL TOTAL CA: CPT | Performed by: EMERGENCY MEDICINE

## 2024-01-02 PROCEDURE — 83735 ASSAY OF MAGNESIUM: CPT | Performed by: EMERGENCY MEDICINE

## 2024-01-02 PROCEDURE — 36415 COLL VENOUS BLD VENIPUNCTURE: CPT | Performed by: EMERGENCY MEDICINE

## 2024-01-02 PROCEDURE — 85025 COMPLETE CBC W/AUTO DIFF WBC: CPT | Performed by: EMERGENCY MEDICINE

## 2024-01-02 PROCEDURE — 73502 X-RAY EXAM HIP UNI 2-3 VIEWS: CPT

## 2024-01-02 PROCEDURE — 99285 EMERGENCY DEPT VISIT HI MDM: CPT | Mod: 25

## 2024-01-02 PROCEDURE — 250N000013 HC RX MED GY IP 250 OP 250 PS 637: Performed by: STUDENT IN AN ORGANIZED HEALTH CARE EDUCATION/TRAINING PROGRAM

## 2024-01-02 PROCEDURE — 120N000001 HC R&B MED SURG/OB

## 2024-01-02 PROCEDURE — 82306 VITAMIN D 25 HYDROXY: CPT | Performed by: PHYSICIAN ASSISTANT

## 2024-01-02 PROCEDURE — 72192 CT PELVIS W/O DYE: CPT | Mod: MG

## 2024-01-02 PROCEDURE — 93005 ELECTROCARDIOGRAM TRACING: CPT

## 2024-01-02 RX ORDER — AMMONIUM LACTATE 12 G/100G
LOTION TOPICAL DAILY
COMMUNITY

## 2024-01-02 RX ORDER — AMOXICILLIN 250 MG
1 CAPSULE ORAL 2 TIMES DAILY PRN
Status: DISCONTINUED | OUTPATIENT
Start: 2024-01-02 | End: 2024-01-06 | Stop reason: HOSPADM

## 2024-01-02 RX ORDER — NALOXONE HYDROCHLORIDE 0.4 MG/ML
0.2 INJECTION, SOLUTION INTRAMUSCULAR; INTRAVENOUS; SUBCUTANEOUS
Status: DISCONTINUED | OUTPATIENT
Start: 2024-01-02 | End: 2024-01-06 | Stop reason: HOSPADM

## 2024-01-02 RX ORDER — NALOXONE HYDROCHLORIDE 0.4 MG/ML
0.4 INJECTION, SOLUTION INTRAMUSCULAR; INTRAVENOUS; SUBCUTANEOUS
Status: DISCONTINUED | OUTPATIENT
Start: 2024-01-02 | End: 2024-01-06 | Stop reason: HOSPADM

## 2024-01-02 RX ORDER — CHOLECALCIFEROL (VITAMIN D3) 50 MCG
1 TABLET ORAL DAILY
COMMUNITY

## 2024-01-02 RX ORDER — ACETAMINOPHEN 325 MG/1
650 TABLET ORAL EVERY 4 HOURS PRN
Status: DISCONTINUED | OUTPATIENT
Start: 2024-01-02 | End: 2024-01-06 | Stop reason: HOSPADM

## 2024-01-02 RX ORDER — LOPERAMIDE HCL 2 MG
4 CAPSULE ORAL DAILY PRN
COMMUNITY

## 2024-01-02 RX ORDER — PAROXETINE 20 MG/1
20 TABLET, FILM COATED ORAL AT BEDTIME
Status: DISCONTINUED | OUTPATIENT
Start: 2024-01-02 | End: 2024-01-06 | Stop reason: HOSPADM

## 2024-01-02 RX ORDER — ACETAMINOPHEN 650 MG/1
650 SUPPOSITORY RECTAL EVERY 4 HOURS PRN
Status: DISCONTINUED | OUTPATIENT
Start: 2024-01-02 | End: 2024-01-06 | Stop reason: HOSPADM

## 2024-01-02 RX ORDER — AMOXICILLIN 250 MG
2 CAPSULE ORAL 2 TIMES DAILY PRN
Status: DISCONTINUED | OUTPATIENT
Start: 2024-01-02 | End: 2024-01-06 | Stop reason: HOSPADM

## 2024-01-02 RX ORDER — ONDANSETRON 4 MG/1
4 TABLET, ORALLY DISINTEGRATING ORAL EVERY 6 HOURS PRN
Status: DISCONTINUED | OUTPATIENT
Start: 2024-01-02 | End: 2024-01-06 | Stop reason: HOSPADM

## 2024-01-02 RX ORDER — TRANEXAMIC ACID 10 MG/ML
1 INJECTION, SOLUTION INTRAVENOUS ONCE
Qty: 100 ML | Refills: 0 | Status: COMPLETED | OUTPATIENT
Start: 2024-01-03 | End: 2024-01-03

## 2024-01-02 RX ORDER — LIDOCAINE 40 MG/G
CREAM TOPICAL
Status: DISCONTINUED | OUTPATIENT
Start: 2024-01-02 | End: 2024-01-06 | Stop reason: HOSPADM

## 2024-01-02 RX ORDER — ONDANSETRON 2 MG/ML
4 INJECTION INTRAMUSCULAR; INTRAVENOUS EVERY 6 HOURS PRN
Status: DISCONTINUED | OUTPATIENT
Start: 2024-01-02 | End: 2024-01-06 | Stop reason: HOSPADM

## 2024-01-02 RX ORDER — HYDROXYZINE HYDROCHLORIDE 10 MG/1
10 TABLET, FILM COATED ORAL 3 TIMES DAILY PRN
Status: DISCONTINUED | OUTPATIENT
Start: 2024-01-02 | End: 2024-01-06 | Stop reason: HOSPADM

## 2024-01-02 RX ORDER — CEFAZOLIN SODIUM 2 G/100ML
2 INJECTION, SOLUTION INTRAVENOUS SEE ADMIN INSTRUCTIONS
Status: DISCONTINUED | OUTPATIENT
Start: 2024-01-03 | End: 2024-01-03 | Stop reason: HOSPADM

## 2024-01-02 RX ORDER — CEFAZOLIN SODIUM 2 G/100ML
2 INJECTION, SOLUTION INTRAVENOUS
Status: DISCONTINUED | OUTPATIENT
Start: 2024-01-03 | End: 2024-01-03 | Stop reason: HOSPADM

## 2024-01-02 RX ORDER — AMOXICILLIN 250 MG
1 CAPSULE ORAL DAILY PRN
COMMUNITY

## 2024-01-02 RX ORDER — CALCIUM CARBONATE 500 MG/1
1000 TABLET, CHEWABLE ORAL 4 TIMES DAILY PRN
Status: DISCONTINUED | OUTPATIENT
Start: 2024-01-02 | End: 2024-01-06 | Stop reason: HOSPADM

## 2024-01-02 RX ORDER — OXYCODONE HYDROCHLORIDE 5 MG/1
5 TABLET ORAL EVERY 4 HOURS PRN
Status: DISCONTINUED | OUTPATIENT
Start: 2024-01-02 | End: 2024-01-06 | Stop reason: HOSPADM

## 2024-01-02 RX ORDER — CHOLECALCIFEROL (VITAMIN D3) 50 MCG
50 TABLET ORAL ONCE
Status: COMPLETED | OUTPATIENT
Start: 2024-01-02 | End: 2024-01-02

## 2024-01-02 RX ADMIN — PAROXETINE HYDROCHLORIDE 20 MG: 20 TABLET, FILM COATED ORAL at 21:38

## 2024-01-02 ASSESSMENT — ACTIVITIES OF DAILY LIVING (ADL)
ADLS_ACUITY_SCORE: 35
ADLS_ACUITY_SCORE: 35
ADLS_ACUITY_SCORE: 41
ADLS_ACUITY_SCORE: 35

## 2024-01-02 NOTE — PHARMACY-ADMISSION MEDICATION HISTORY
Pharmacy Intern Admission Medication History    Admission medication history is complete. The information provided in this note is only as accurate as the sources available at the time of the update.    Information Source(s): Facility (U/NH/) medication list/MAR and CareEverywhere/SureScripts via phone    Pertinent Information:   -- From [facility] dhruv Puckett [phone],  reports that pt is not currently on anticoagulants    Changes made to PTA medication list:  Added: loperamide, senna S, ammonium lactate, vit D3  Deleted: eliquis, tums  Changed: None    Medication Affordability:  Not including over the counter (OTC) medications, was there a time in the past 3 months when you did not take your medications as prescribed because of cost?: No    Allergies reviewed with patient and updates made in EHR: yes    Medication History Completed By: RAMIREZ RUTH 1/2/2024 2:06 PM    Prior to Admission medications    Medication Sig Last Dose Taking? Auth Provider Long Term End Date   acetaminophen (TYLENOL) 325 MG tablet Take 2 tablets (650 mg) by mouth every 4 hours as needed for mild pain 1/2/2024 at am Yes Surya Boles MD     ammonium lactate (LAC-HYDRIN) 12 % external lotion Apply topically daily 1/1/2024 at am Yes Unknown, Entered By History     loperamide (IMODIUM) 2 MG capsule Take 4 mg by mouth daily as needed for diarrhea  at PRN Yes Unknown, Entered By History     PARoxetine (PAXIL) 20 MG tablet Take 20 mg by mouth At Bedtime 1/1/2024 at pm Yes Unknown, Entered By History No    senna-docusate (SENOKOT-S/PERICOLACE) 8.6-50 MG tablet Take 1 tablet by mouth daily as needed for constipation  at PRN Yes Unknown, Entered By History     vitamin D3 (CHOLECALCIFEROL) 50 mcg (2000 units) tablet Take 1 tablet by mouth daily 1/1/2024 at pm Yes Unknown, Entered By History

## 2024-01-02 NOTE — CONSULTS
[facility]    Orthopedic Consultation    Mariah Winslow MRN# [id number]   Age: 78 year old YOB: 1945     Date of Admission: 1/2/2024    Reason for consult: Right intertrochanteric femur fracture       Requesting physician: Regina Rodriguez MD       Level of consult: Consult, follow and place orders           Assessment and Plan:   Assessment:   Acute, closed, varus angulated, comminuted, displaced right intertrochanteric femur fracture  Old, healed right inferior pubic ramus fracture      Plan:   The patient's history and clinical/diagnostic findings were reviewed with the on-call orthopedic trauma surgeon, Dr. Clarence Pearson. The patient sustained an unwitnessed fall on 1/2/24 at her [facility] resulting in a right intertrochanteric femur fracture. Surgical intervention is recommended for the goals of fracture stabilization, pain control, and to maximize mobility/functionality postoperatively. Brief discussion held over potential risks and benefits of nonoperative and operative management with the patient's sister/POA, Shawna, by phone. The patient/the patient's family wishes to proceed with surgery as recommended. The patient will be scheduled for a right intertrochanteric femur fracture ORIF using IM nail on 1/3/24 pending medical optimization by the hospital team. Dr. Pearson will further discuss the risks, benefits, and outcomes of surgery while obtaining consent.     -NPO at midnight. Okay for diet today from an orthopedic standpoint.  -NWB/bedrest until postop.  -Continue pain regimen.  -Hold any PTA anticoagulation (none per review of [facility]'s medication list).   -Vitamin D deficiency lab and supplementation ordered.  -Type and screen ordered.    Please contact orthopedic trauma team if any questions or concerns arise.           Chief Complaint:   Right hip fracture         History of Present Illness:   Medical history obtained via chart review and  discussion with the patient's sister/POA, Shawna, by phone. The patient has advanced dementia and is unable to meaningfully contribute during today's encounter. Mariah Winslow is a 78 year old female with past medical history of dementia, chronic mild anemia, right kidney mass, and history of DVT (not anticoagulated) who presented to the [facility] ED for right hip pain after an unwitnessed fall at her memory care facility on 1/2/24 resulting in a right intertrochanteric femur fracture. Per chart review, the patient went to bed normally on 1/1/24 and was found by staff this morning (1/2/24) on the ground complaining of right hip pain. Patient was taken to [facility] ED where radiographs demonstrated a right intertrochanteric femur fracture with displacement, comminution, and varus angulation. There was a possible right inferior pubic ramus fracture, favored to be chronic, that she underwent a CT for without any other bony changes. Patient restless and with minimal verbalization while in the ED. Occasionally answers yes to questions, otherwise not communicative. Seems to be uncomfortable with movement of the RLE. No known prior surgeries to the right hip. Per the patient's sister, Shawna, the patient normally enjoys walking throughout her memory care facility. She does not use a walker or cane to her knowledge. No PTA anticoagulation. No known recent illnesses. Tolerating PO intake.          Past Medical History:     Past Medical History:   Diagnosis Date    Dementia (H)     Major depressive disorder, recurrent episode, moderate (H) 4/29/2014             Past Surgical History:     Past Surgical History:   Procedure Laterality Date    APPENDECTOMY  1982    OOPHORECTOMY Left 1982             Social History:     Social History     Tobacco Use    Smoking status: Every Day     Packs/day: 1.50     Years: 35.00     Additional pack years: 0.00     Total pack years: 52.50     Types: Cigarettes    Smokeless tobacco: Never   Substance Use  Topics    Alcohol use: Yes     Comment: 2 times per week, 2 beers per time             Family History:     Family History   Problem Relation Age of Onset    Diabetes Maternal Aunt     Hypertension Sister     Myocardial Infarction Maternal Uncle     Myocardial Infarction Paternal Uncle     Heart Disease Sister 62        Stent placed             Immunizations:     VACCINE/DOSE   Diptheria   DPT   DTAP   HBIG   Hepatitis A   Hepatitis B   HIB   Influenza   Measles   Meningococcal   MMR   Mumps   Pneumococcal   Polio   Rubella   Small Pox   TDAP   Varicella   Zoster             Allergies:     Allergies   Allergen Reactions    Penicillins              Medications:     No current facility-administered medications for this encounter.     Current Outpatient Medications   Medication Sig    acetaminophen (TYLENOL) 325 MG tablet Take 2 tablets (650 mg) by mouth every 4 hours as needed for mild pain    ammonium lactate (LAC-HYDRIN) 12 % external lotion Apply topically daily    loperamide (IMODIUM) 2 MG capsule Take 4 mg by mouth daily as needed for diarrhea    PARoxetine (PAXIL) 20 MG tablet Take 20 mg by mouth At Bedtime    senna-docusate (SENOKOT-S/PERICOLACE) 8.6-50 MG tablet Take 1 tablet by mouth daily as needed for constipation    vitamin D3 (CHOLECALCIFEROL) 50 mcg (2000 units) tablet Take 1 tablet by mouth daily             Review of Systems:   Unable to obtain reliable ROS due to patient's dementia.          Physical Exam:   All vitals have been reviewed  Patient Vitals for the past 24 hrs:   BP Temp Pulse Resp SpO2   01/02/24 1500 -- -- -- -- 95 %   01/02/24 1456 -- -- -- -- 96 %   01/02/24 1441 -- -- -- -- 93 %   01/02/24 1426 -- -- -- -- 95 %   01/02/24 1411 -- -- -- -- 95 %   01/02/24 1341 125/57 -- 110 -- 94 %   01/02/24 1010 -- 98.5  F (36.9  C) -- 16 --   01/02/24 1009 -- -- -- -- 97 %   01/02/24 1008 (!) 145/72 -- 102 -- --     No intake or output data in the 24 hours ending 01/02/24 1620    Constitutional:  Pleasant, alert, baseline dementia. Does not follow commands or participate in exam.  HEENT: Head atraumatic normocephalic. Pupils equal round and reactive.  Respiratory: Unlabored breathing no audible wheeze  Cardiovascular: Regular rate and rhythm per pulses.  GI: Abdomen is non-distended.  Lymph/Hematologic: No lymphadenopathy in areas examined.  Genitourinary: No barrett  Skin: No rashes, no cyanosis, no edema.  Musculoskeletal: Right lower extremity: Slightly shortened and externally rotated. Tiny superficial abrasion to the right proximal thigh, anteriorly, but no other skin wounds visualized. Seborrheic keratosis lesion to the right proximal thigh, laterally. Moderate swelling to the right proximal thigh/hip. No erythema or ecchymosis. No grimacing with palpation to the distal thigh, medial and lateral knee joint lines, popliteal fossa, patella, calf, and ankle/foot diffusely. Any spontaneous movement of the right leg seems to increase the patient's right hip pain. Patient is able to spontaneously range the ankles and toes, bilaterally, but does not do this on command. DP pulses palpable, bilaterally. Toes are warm. Sensation grossly intact.  Neurologic: GCS 15, alert not oriented to person, place, or time          Data:   All laboratory data reviewed  Results for orders placed or performed during the hospital encounter of 01/02/24   XR Pelvis w Hip Right 1 View     Status: None    Narrative    XR PELVIS AND HIP RIGHT 1 VIEW 1/2/2024 10:54 AM     HISTORY: pain    COMPARISON: None.       Impression    IMPRESSION:   Acute, displaced and angulated right proximal femoral  intratrochanteric fracture with varus alignment. Bones are  demineralized and there is mild degenerative change of the right hip.    Question nondisplaced age indeterminate fracture of right inferior  pubic ramus.    NOTE: ABNORMAL REPORT    THE DICTATION ABOVE DESCRIBES AN ABNORMAL REPORT FOR WHICH FOLLOW-UP  IS NEEDED.    CAMELIA MCKENZIE MD          SYSTEM ID:  [id number]   CT Pelvis Bone wo Contrast     Status: None    Narrative    CT PELVIS BONE WITHOUT CONTRAST  1/2/2024 12:55 PM    INDICATION: Right proximal femur fracture. Possible pelvic fracture.    COMPARISON: 1/2/2024 radiographs.    TECHNIQUE: Noncontrast. Axial, sagittal and coronal thin-section  reconstruction. Dose reduction techniques were used.   CONTRAST: None.    FINDINGS:   BONES AND JOINTS:  -Acute comminuted intratrochanteric fracture of the right femur.  Medial impaction and moderate varus angulation.  -Old healed fracture of the right inferior pubic ramus.  -Mild right hip degenerative arthrosis.  -Moderate lower lumbar facet arthrosis. Mild bilateral sacroiliac  degenerative arthrosis. Minimal left hip degenerative arthrosis.  -Mild bone demineralization.    MUSCULATURE AND SOFT TISSUES:  -Indeterminate multilocular cystic lesion in the right adnexa,  measuring 8 cm in greatest dimension.  -Mild enlargement of the right vastus medialis and lateralis  musculature adjacent to the fracture.  -No soft tissue fluid collection.  -Advanced atrophy of the right inferior rectus abdominis muscle.       Impression    IMPRESSION:  1.  Acute comminuted intertrochanteric fracture of the right femur  with varus angulation.  2.  Old healed fracture of the right inferior pubic ramus.  3.  Indeterminate multilocular cystic mass in the right adnexa,  measuring 8 cm in greatest dimension. Gynecology consultation  recommended. This could be further characterized with ultrasound or  MRI.  4.  Remaining less significant findings detailed above.    KYLE HUYNH MD         SYSTEM ID:  [id number]   Basic metabolic panel     Status: Abnormal   Result Value Ref Range    Sodium 138 135 - 145 mmol/L    Potassium 4.9 3.4 - 5.3 mmol/L    Chloride 104 98 - 107 mmol/L    Carbon Dioxide (CO2) 25 22 - 29 mmol/L    Anion Gap 9 7 - 15 mmol/L    Urea Nitrogen 18.1 8.0 - 23.0 mg/dL    Creatinine 0.68 0.51 - 0.95 mg/dL     GFR Estimate 90 >60 mL/min/1.73m2    Calcium 9.3 8.8 - 10.2 mg/dL    Glucose 142 (H) 70 - 99 mg/dL   Magnesium     Status: Normal   Result Value Ref Range    Magnesium 2.0 1.7 - 2.3 mg/dL   CBC with platelets and differential     Status: Abnormal   Result Value Ref Range    WBC Count 15.0 (H) 4.0 - 11.0 10e3/uL    RBC Count 3.38 (L) 3.80 - 5.20 10e6/uL    Hemoglobin 10.8 (L) 11.7 - 15.7 g/dL    Hematocrit 31.7 (L) 35.0 - 47.0 %    MCV 94 78 - 100 fL    MCH 32.0 26.5 - 33.0 pg    MCHC 34.1 31.5 - 36.5 g/dL    RDW 13.0 10.0 - 15.0 %    Platelet Count 289 150 - 450 10e3/uL    % Neutrophils 87 %    % Lymphocytes 6 %    % Monocytes 6 %    % Eosinophils 0 %    % Basophils 0 %    % Immature Granulocytes 1 %    NRBCs per 100 WBC 0 <1 /100    Absolute Neutrophils 13.2 (H) 1.6 - 8.3 10e3/uL    Absolute Lymphocytes 0.9 0.8 - 5.3 10e3/uL    Absolute Monocytes 0.9 0.0 - 1.3 10e3/uL    Absolute Eosinophils 0.0 0.0 - 0.7 10e3/uL    Absolute Basophils 0.0 0.0 - 0.2 10e3/uL    Absolute Immature Granulocytes 0.1 <=0.4 10e3/uL    Absolute NRBCs 0.0 10e3/uL   Vitamin D Deficiency     Status: Normal   Result Value Ref Range    Vitamin D, Total (25-Hydroxy) 31 20 - 50 ng/mL    Narrative    Season, race, dietary intake, and treatment affect the concentration of 25-hydroxy-Vitamin D. Values may decrease during winter months and increase during summer months.    Vitamin D determination is routinely performed by an immunoassay specific for 25 hydroxyvitamin D3.  If an individual is on vitamin D2(ergocalciferol) supplementation, please specify 25 OH vitamin D2 and D3 level determination by LCMSMS test VITD23.     EKG 12-lead, tracing only     Status: None (Preliminary result)   Result Value Ref Range    Systolic Blood Pressure  mmHg    Diastolic Blood Pressure  mmHg    Ventricular Rate 102 BPM    Atrial Rate 102 BPM    MI Interval 144 ms    QRS Duration 70 ms     ms    QTc 445 ms    P Axis 63 degrees    R AXIS 41 degrees    T Axis 20  degrees    Interpretation ECG       Sinus tachycardia  Cannot rule out Anterior infarct (cited on or before 02-JAN-2024)  Abnormal ECG  When compared with ECG of 06-OCT-1997 15:35,  Vent. rate has increased BY  42 BPM  Non-specific change in ST segment in Inferior leads  Non-specific change in ST segment in Anterior leads  T wave inversion now evident in Inferior leads  Nonspecific T wave abnormality now evident in Anterior leads     Adult Type and Screen     Status: None   Result Value Ref Range    ABO/RH(D) O POS     Antibody Screen Negative Negative    SPECIMEN EXPIRATION DATE [id number]    CBC with platelets differential     Status: Abnormal    Narrative    The following orders were created for panel order CBC with platelets differential.  Procedure                               Abnormality         Status                     ---------                               -----------         ------                     CBC with platelets and d...[[id number]]  Abnormal            Final result                 Please view results for these tests on the individual orders.   ABO/Rh type and screen     Status: None    Narrative    The following orders were created for panel order ABO/Rh type and screen.  Procedure                               Abnormality         Status                     ---------                               -----------         ------                     Adult Type and Screen[[id number]]                            Final result                 Please view results for these tests on the individual orders.          Attestation:  I have reviewed today's vital signs, notes, medications, labs and imaging with Dr. Neo Pearson.  Amount of time performed on this consult: 60 minutes.    Birgit Rivera PA-C  [facility]

## 2024-01-02 NOTE — H&P
"[facility]    History and Physical - Hospitalist Service       Date of Admission:  1/2/2024    Assessment & Plan      Mariah Winslow is a 78 year old female with past medical history significant for dementia admitted on 1/2/2024 with R hip fracture     Acute intertrochanteric fracture of the right femur   Pt presents to the ED from [facility] with reports that she was complaining of hip pain and was unable to bear weight this morning. Additional history is limited due to dementia. Per NH staff pt had an unwitnessed fall this AM. Unclear mechanism.   *XR of the pelvis, followed by a CT scan of the pelvis showed Acute comminuted intertrochanteric fracture of the right femur with varus angulation. There is also an old healed fracture of the R inferior pubic ramus.   - Admit to inpatient   - Orthopedic surgery consult   - Bedrest   - Pain control as needed   - NPO at midnight  - PT/OT when appropriate     Leukocytosis   WBC elevated to 15.0.   - Obtain UA - no other signs or symptoms of infection   - Monitor WBC count    Chronic Mild Anemia   Hemoglobin 10.8. Stable.  - Monitor hemoglobin    R Adnexal cystic mass   CT also incidentally demonstrated an indeterminate multilocular cystic mass in the right adnexa measuring 8 cm in greatest dimension.     Hx of DVT   No longer on anticoagulation    Dementia  Pt resides in [facility]   - Delirium precautions     Diet: NPO per Anesthesia Guidelines for Procedure/Surgery Except for: Meds, Ice Chips    DVT Prophylaxis: Pneumatic Compression Devices  Flores Catheter: Not present  Lines: None     Cardiac Monitoring: None  Code Status: Full Code   Discussed Code status with the patient's sister Shawna at length. She is quite confident that the patient would want attempts at resuscitation for reversible issues and would only want DNR if she were in a \"vegetative state.\" Will change code status to Full Code for now. Recommend continued Code discussions "     Clinically Significant Risk Factors Present on Admission               # Drug Induced Coagulation Defect: home medication list includes an anticoagulant medication      # Dementia: noted on problem list               Disposition Plan      Expected Discharge Date: 01/04/2024                  Regina Rodriguez MD  Hospitalist Service  [facility]  Securely message with [employer] (more info)  Text page via [employer] Paging/Directory     ______________________________________________________________________    Chief Complaint   R hip pain     History is obtained from the patient    History of Present Illness   Mariah Winslow is a 78 year old female who presents to the ED from her memory care facility with complaints of R hip pain. She apparently had an unwitnessed fall earlier today. Details are unclear. Pt is unable to provide any additional history due to underlying dementia. She will not even answer any yes/no questions.       Past Medical History    Past Medical History:   Diagnosis Date    Dementia (H)     Major depressive disorder, recurrent episode, moderate (H) 4/29/2014       Past Surgical History   Past Surgical History:   Procedure Laterality Date    APPENDECTOMY  1982    OOPHORECTOMY Left 1982       Prior to Admission Medications   Prior to Admission Medications   Prescriptions Last Dose Informant Patient Reported? Taking?   PARoxetine (PAXIL) 20 MG tablet  Nursing Home Yes No   Sig: Take 20 mg by mouth At Bedtime   acetaminophen (TYLENOL) 325 MG tablet   No No   Sig: Take 2 tablets (650 mg) by mouth every 4 hours as needed for mild pain   apixaban ANTICOAGULANT (ELIQUIS) 5 MG tablet   No No   Sig: 10 mg bid for 1 week, then 5 mg bid for 6 months -- then reassess whether to continue -- for treatment of Left DVT 3/26/20   calcium carbonate (TUMS) 500 MG chewable tablet  Nursing Home Yes No   Sig: Take 1 chew tab by mouth every 2 hours as needed for heartburn       Facility-Administered Medications: None        Review of Systems    Unable to obtain review of systems due to dementia.     Physical Exam   Vital Signs: Temp: 98.5  F (36.9  C)   BP: (!) 145/72 Pulse: 102   Resp: 16 SpO2: 97 %      Weight: 0 lbs 0 oz    Constitutional: Awake, alert, no apparent distress   Eyes: Conjunctiva and pupils examined and normal.  HEENT: Moist mucous membranes, normal dentition.  Respiratory: Clear to auscultation bilaterally, no crackles or wheezing.  Cardiovascular: Regular rate and rhythm, normal S1 and S2, and no murmur noted.  GI: Soft, non-distended, non-tender, normal bowel sounds.  Skin: No rashes, no cyanosis, no edema.  Musculoskeletal: No joint swelling, erythema or tenderness.  Neurologic/Psychiatric: Cranial nerves 2-12 intact  Alert, advanced dementia, pt makes eye contact, but does not answer any questions.     Medical Decision Making       75 MINUTES SPENT BY ME on the date of service doing chart review, history, exam, documentation & further activities per the note.      Data     I have personally reviewed the following data over the past 24 hrs:    15.0 (H)  \   10.8 (L)   / 289     138 104 18.1 /  142 (H)   4.9 25 0.68 \       Imaging results reviewed over the past 24 hrs:   Recent Results (from the past 24 hour(s))   XR Pelvis w Hip Right 1 View    Narrative    XR PELVIS AND HIP RIGHT 1 VIEW 1/2/2024 10:54 AM     HISTORY: pain    COMPARISON: None.       Impression    IMPRESSION:   Acute, displaced and angulated right proximal femoral  intratrochanteric fracture with varus alignment. Bones are  demineralized and there is mild degenerative change of the right hip.    Question nondisplaced age indeterminate fracture of right inferior  pubic ramus.    NOTE: ABNORMAL REPORT    THE DICTATION ABOVE DESCRIBES AN ABNORMAL REPORT FOR WHICH FOLLOW-UP  IS NEEDED.    CAMELIA MCKENZIE MD         SYSTEM ID:  [id number]   CT Pelvis Bone wo Contrast    Narrative    CT PELVIS BONE WITHOUT  CONTRAST  1/2/2024 12:55 PM    INDICATION: Right proximal femur fracture. Possible pelvic fracture.    COMPARISON: 1/2/2024 radiographs.    TECHNIQUE: Noncontrast. Axial, sagittal and coronal thin-section  reconstruction. Dose reduction techniques were used.   CONTRAST: None.    FINDINGS:   BONES AND JOINTS:  -Acute comminuted intratrochanteric fracture of the right femur.  Medial impaction and moderate varus angulation.  -Old healed fracture of the right inferior pubic ramus.  -Mild right hip degenerative arthrosis.  -Moderate lower lumbar facet arthrosis. Mild bilateral sacroiliac  degenerative arthrosis. Minimal left hip degenerative arthrosis.  -Mild bone demineralization.    MUSCULATURE AND SOFT TISSUES:  -Indeterminate multilocular cystic lesion in the right adnexa,  measuring 8 cm in greatest dimension.  -Mild enlargement of the right vastus medialis and lateralis  musculature adjacent to the fracture.  -No soft tissue fluid collection.  -Advanced atrophy of the right inferior rectus abdominis muscle.       Impression    IMPRESSION:  1.  Acute comminuted intertrochanteric fracture of the right femur  with varus angulation.  2.  Old healed fracture of the right inferior pubic ramus.  3.  Indeterminate multilocular cystic mass in the right adnexa,  measuring 8 cm in greatest dimension. Gynecology consultation  recommended. This could be further characterized with ultrasound or  MRI.  4.  Remaining less significant findings detailed above.    KYLE HUYNH MD         SYSTEM ID:  [id number]

## 2024-01-02 NOTE — ED TRIAGE NOTES
Pt BIBA from memory care for right hip pain, ? unwitnessed fall. Pt ambulates independently, went to bed without any concerns. Pt unable to weight  bear this morning, holding onto her R hip. Pt's sister who Letty who is the POA was notified and wanted pt transported to ED for further testing

## 2024-01-02 NOTE — ED PROVIDER NOTES
History     Chief Complaint:  Hip Pain (R hip pain )       History limited by: dementia.      Mariah Winslow is a 78 year old female with a history of DVT on Eliquis, hyperlipidemia, COPD, and dementia who presents from memory care via EMS with right hip pain. Per staff, patient had an unwitnessed fall this morning and was unable to bear weight following the fall. She was clutching her right hip at that time.     Independent Historian:   I spoke with the patient's sister, Shawna, who stated that the patient's limited verbal history, answering yes to most questions, is consistent with her history of Alzheimer's.    Review of External Notes:   Nursing home documentation reviewed which included a medication list that showed no anticoagulants.    Medications:    Eliquis  Paxil  Spiriva    Past Medical History:    Dementia  Depression   COPD  Hyperlipidemia   Anxiety   DVT    Past Surgical History:    Appendectomy   Oophorectomy     Physical Exam   Patient Vitals for the past 24 hrs:   BP Temp Pulse Resp SpO2   01/02/24 1010 -- 98.5  F (36.9  C) -- 16 --   01/02/24 1009 -- -- -- -- 97 %   01/02/24 1008 (!) 145/72 -- 102 -- --        Physical Exam  General: Laying on the ED bed  HEENT: Normocephalic, atraumatic  Cardiac: Warm and well perfused, regular rate and rhythm  Pulm: Breathing comfortably, no accessory muscle usage, no conversational dyspnea, and lungs clear bilaterally  GI: Abdomen soft, nontender, no rigidity or guarding  MSK: No bony deformities, holding both legs in flexion and laying on her left side, limited movement of the right lower extremity at the hip without apparent pain on passive range of motion attempt, nontender to compression of the pelvis  Skin: Warm and dry  Neuro: Awake, answers yes to most questions inappropriately in context consistent with history of dementia    Emergency Department Course     ECG  ECG taken at 1136, ECG read at 1234  NSR   Lead III with TWI as compared to prior, dated  10/6/97.  Rate 102 bpm. ME interval 144 ms. QRS duration 70 ms. QT/QTc 342/445 ms. P-R-T axes 63 41 20.      Imaging:  XR Pelvis w Hip Right 1 View   Final Result   IMPRESSION:    Acute, displaced and angulated right proximal femoral   intratrochanteric fracture with varus alignment. Bones are   demineralized and there is mild degenerative change of the right hip.      Question nondisplaced age indeterminate fracture of right inferior   pubic ramus.      NOTE: ABNORMAL REPORT      THE DICTATION ABOVE DESCRIBES AN ABNORMAL REPORT FOR WHICH FOLLOW-UP   IS NEEDED.      CAMELIA MCKENZIE MD            SYSTEM ID:  [id number]      CT Pelvis Bone wo Contrast    (Results Pending)      Report per radiology    Laboratory:  Labs Ordered and Resulted from Time of ED Arrival to Time of ED Departure   BASIC METABOLIC PANEL - Abnormal       Result Value    Sodium 138      Potassium 4.9      Chloride 104      Carbon Dioxide (CO2) 25      Anion Gap 9      Urea Nitrogen 18.1      Creatinine 0.68      GFR Estimate 90      Calcium 9.3      Glucose 142 (*)    CBC WITH PLATELETS AND DIFFERENTIAL - Abnormal    WBC Count 15.0 (*)     RBC Count 3.38 (*)     Hemoglobin 10.8 (*)     Hematocrit 31.7 (*)     MCV 94      MCH 32.0      MCHC 34.1      RDW 13.0      Platelet Count 289      % Neutrophils 87      % Lymphocytes 6      % Monocytes 6      % Eosinophils 0      % Basophils 0      % Immature Granulocytes 1      NRBCs per 100 WBC 0      Absolute Neutrophils 13.2 (*)     Absolute Lymphocytes 0.9      Absolute Monocytes 0.9      Absolute Eosinophils 0.0      Absolute Basophils 0.0      Absolute Immature Granulocytes 0.1      Absolute NRBCs 0.0     MAGNESIUM - Normal    Magnesium 2.0     VITAMIN D DEFICIENCY SCREENING   ABO/RH TYPE AND SCREEN        Procedures   None    Emergency Department Course & Assessments:       Interventions:  Medications   vitamin D3 (CHOLECALCIFEROL) tablet 50 mcg (has no administration in time range)        Independent  Interpretation (X-rays, CTs, rhythm strip):  Right hip x-ray shows a right intertrochanteric femur fracture    Assessments/Consultations/Discussion of Management or Tests:  1022 initial evaluation  1158 I discussed the patient with [facility]  I discussed the patient with Dr. Rodriguez, Hospitalist    Social Determinants of Health affecting care:   Social Connections/Isolation    Disposition:  The patient was admitted to the hospital under the care of Dr. Rodriguez.     Impression & Plan    CMS Diagnoses: None    Medical Decision Makin-year-old female presents with concern for right hip pain from her facility, history of dementia so verbal history here is quite limited.  X-rays were ordered based on the patient's chief complaint and exam and unfortunately show a right intertrochanteric femur fracture.  There is also question of an isolated inferior ramus fracture on the right.  I spoke with orthopedics who stated the patient is appropriate to stay at [facility], ordering a CT of the pelvis to further characterize the possibility of a ramus fracture.  Patient's pain has been well-controlled in the ED without intervention.  Plan is for admission to the hospitalist service for further care.    Critical Care time:  was 0 minutes for this patient excluding procedures.    Diagnosis:    ICD-10-CM    1. Closed displaced intertrochanteric fracture of right femur, initial encounter (H)  S72.141A Case Request: OPEN REDUCTION INTERNAL FIXATION, FRACTURE, FEMUR, USING INTRAMEDULLARY JOSE LUIS, RIGHT     Case Request: OPEN REDUCTION INTERNAL FIXATION, FRACTURE, FEMUR, USING INTRAMEDULLARY JOSE LUIS, RIGHT                Scribe Disclosure:  Jackie FOURNIER, am serving as a scribe at 10:25 AM on 2024 to document services personally performed by Alex Yu MD based on my observations and the provider's statements to me.     2024   Alex Yu MD King, Colin, MD  24 1545

## 2024-01-02 NOTE — PROGRESS NOTES
RECEIVING UNIT ED HANDOFF REVIEW    ED Nurse Handoff Report was reviewed by: Laurie Felix RN on January 2, 2024 at 4:10 PM

## 2024-01-02 NOTE — ED NOTES
[facility]  ED Nurse Handoff Report    ED Chief complaint: Hip Pain (R hip pain )      ED Diagnosis:   Final diagnoses:   Closed displaced intertrochanteric fracture of right femur, initial encounter (H)       Code Status: Admitting provider to address     Allergies:   Allergies   Allergen Reactions    Penicillins        Patient Story: Pt is a 78 year old female who presents from [facility] via EMS with right hip pain. Per staff, patient had an unwitnessed fall this morning and was unable to bear weight following the fall. She was clutching her right hip at that time.      Focused Assessment:  Pt appears awake, alert and non verbal.     Treatments and/or interventions provided: Labs, CT, Xray     Patient's response to treatments and/or interventions: Pt tolerated well     To be done/followed up on inpatient unit:      Does this patient have any cognitive concerns?: Disoriented to time, Disoriented to place, Disoriented to situation, and Disorientation to person    Activity level - Baseline/Home:  Independent  Activity Level - Current:   Unknown    Patient's Preferred language: English   Needed?: No    Isolation: None  Infection: Not Applicable  Patient tested for COVID 19 prior to admission: NO  Bariatric?: No    Vital Signs:   Vitals:    01/02/24 1008 01/02/24 1009 01/02/24 1010   BP: (!) 145/72     Pulse: 102     Resp:   16   Temp:   98.5  F (36.9  C)   SpO2:  97%        Cardiac Rhythm:     Was the PSS-3 completed:   Yes  What interventions are required if any?               Family Comments: No family at bedside   OBS brochure/video discussed/provided to patient/family: No              Name of person given brochure if not patient:               Relationship to patient:     For the majority of the shift this patient's behavior was Green.   Behavioral interventions performed were .    ED NURSE PHONE NUMBER: [phone]

## 2024-01-03 ENCOUNTER — ANESTHESIA EVENT (OUTPATIENT)
Dept: SURGERY | Facility: CLINIC | Age: 79
DRG: 481 | End: 2024-01-03
Payer: MEDICARE

## 2024-01-03 ENCOUNTER — ANESTHESIA (OUTPATIENT)
Dept: SURGERY | Facility: CLINIC | Age: 79
DRG: 481 | End: 2024-01-03
Payer: MEDICARE

## 2024-01-03 ENCOUNTER — APPOINTMENT (OUTPATIENT)
Dept: GENERAL RADIOLOGY | Facility: CLINIC | Age: 79
DRG: 481 | End: 2024-01-03
Attending: ORTHOPAEDIC SURGERY
Payer: MEDICARE

## 2024-01-03 LAB
ALBUMIN UR-MCNC: 30 MG/DL
ANION GAP SERPL CALCULATED.3IONS-SCNC: 11 MMOL/L (ref 7–15)
APPEARANCE UR: ABNORMAL
BACTERIA #/AREA URNS HPF: ABNORMAL /HPF
BILIRUB UR QL STRIP: NEGATIVE
BUN SERPL-MCNC: 16.7 MG/DL (ref 8–23)
CALCIUM SERPL-MCNC: 8.9 MG/DL (ref 8.8–10.2)
CHLORIDE SERPL-SCNC: 106 MMOL/L (ref 98–107)
COLOR UR AUTO: ABNORMAL
CREAT SERPL-MCNC: 0.78 MG/DL (ref 0.51–0.95)
DEPRECATED HCO3 PLAS-SCNC: 26 MMOL/L (ref 22–29)
EGFRCR SERPLBLD CKD-EPI 2021: 78 ML/MIN/1.73M2
ERYTHROCYTE [DISTWIDTH] IN BLOOD BY AUTOMATED COUNT: 13.2 % (ref 10–15)
GLUCOSE BLDC GLUCOMTR-MCNC: 136 MG/DL (ref 70–99)
GLUCOSE SERPL-MCNC: 127 MG/DL (ref 70–99)
GLUCOSE UR STRIP-MCNC: NEGATIVE MG/DL
HCT VFR BLD AUTO: 29.5 % (ref 35–47)
HGB BLD-MCNC: 9.8 G/DL (ref 11.7–15.7)
HGB UR QL STRIP: NEGATIVE
KETONES UR STRIP-MCNC: ABNORMAL MG/DL
LEUKOCYTE ESTERASE UR QL STRIP: ABNORMAL
MCH RBC QN AUTO: 31.8 PG (ref 26.5–33)
MCHC RBC AUTO-ENTMCNC: 33.2 G/DL (ref 31.5–36.5)
MCV RBC AUTO: 96 FL (ref 78–100)
MUCOUS THREADS #/AREA URNS LPF: PRESENT /LPF
NITRATE UR QL: POSITIVE
PH UR STRIP: 5.5 [PH] (ref 5–7)
PLATELET # BLD AUTO: 263 10E3/UL (ref 150–450)
POTASSIUM SERPL-SCNC: 3.9 MMOL/L (ref 3.4–5.3)
RBC # BLD AUTO: 3.08 10E6/UL (ref 3.8–5.2)
RBC URINE: 8 /HPF
SODIUM SERPL-SCNC: 143 MMOL/L (ref 135–145)
SP GR UR STRIP: 1.03 (ref 1–1.03)
SQUAMOUS EPITHELIAL: 1 /HPF
UROBILINOGEN UR STRIP-MCNC: NORMAL MG/DL
WBC # BLD AUTO: 9.2 10E3/UL (ref 4–11)
WBC URINE: 133 /HPF

## 2024-01-03 PROCEDURE — 250N000013 HC RX MED GY IP 250 OP 250 PS 637

## 2024-01-03 PROCEDURE — 99232 SBSQ HOSP IP/OBS MODERATE 35: CPT | Performed by: HOSPITALIST

## 2024-01-03 PROCEDURE — 250N000009 HC RX 250: Performed by: PHYSICIAN ASSISTANT

## 2024-01-03 PROCEDURE — 250N000013 HC RX MED GY IP 250 OP 250 PS 637: Performed by: STUDENT IN AN ORGANIZED HEALTH CARE EDUCATION/TRAINING PROGRAM

## 2024-01-03 PROCEDURE — 360N000084 HC SURGERY LEVEL 4 W/ FLUORO, PER MIN: Performed by: ORTHOPAEDIC SURGERY

## 2024-01-03 PROCEDURE — 272N000001 HC OR GENERAL SUPPLY STERILE: Performed by: ORTHOPAEDIC SURGERY

## 2024-01-03 PROCEDURE — 81001 URINALYSIS AUTO W/SCOPE: CPT | Performed by: STUDENT IN AN ORGANIZED HEALTH CARE EDUCATION/TRAINING PROGRAM

## 2024-01-03 PROCEDURE — 250N000011 HC RX IP 250 OP 636: Performed by: NURSE ANESTHETIST, CERTIFIED REGISTERED

## 2024-01-03 PROCEDURE — 250N000025 HC SEVOFLURANE, PER MIN: Performed by: ORTHOPAEDIC SURGERY

## 2024-01-03 PROCEDURE — 258N000003 HC RX IP 258 OP 636: Performed by: NURSE ANESTHETIST, CERTIFIED REGISTERED

## 2024-01-03 PROCEDURE — 999N000179 XR SURGERY CARM FLUORO LESS THAN 5 MIN W STILLS

## 2024-01-03 PROCEDURE — 36415 COLL VENOUS BLD VENIPUNCTURE: CPT | Performed by: ORTHOPAEDIC SURGERY

## 2024-01-03 PROCEDURE — 258N000003 HC RX IP 258 OP 636: Performed by: ANESTHESIOLOGY

## 2024-01-03 PROCEDURE — 250N000009 HC RX 250: Performed by: NURSE ANESTHETIST, CERTIFIED REGISTERED

## 2024-01-03 PROCEDURE — 80048 BASIC METABOLIC PNL TOTAL CA: CPT | Performed by: ORTHOPAEDIC SURGERY

## 2024-01-03 PROCEDURE — 85027 COMPLETE CBC AUTOMATED: CPT | Performed by: ORTHOPAEDIC SURGERY

## 2024-01-03 PROCEDURE — 710N000010 HC RECOVERY PHASE 1, LEVEL 2, PER MIN: Performed by: ORTHOPAEDIC SURGERY

## 2024-01-03 PROCEDURE — 370N000017 HC ANESTHESIA TECHNICAL FEE, PER MIN: Performed by: ORTHOPAEDIC SURGERY

## 2024-01-03 PROCEDURE — 250N000009 HC RX 250: Performed by: ORTHOPAEDIC SURGERY

## 2024-01-03 PROCEDURE — 999N000141 HC STATISTIC PRE-PROCEDURE NURSING ASSESSMENT: Performed by: ORTHOPAEDIC SURGERY

## 2024-01-03 PROCEDURE — 0QS606Z REPOSITION RIGHT UPPER FEMUR WITH INTRAMEDULLARY INTERNAL FIXATION DEVICE, OPEN APPROACH: ICD-10-PCS | Performed by: ORTHOPAEDIC SURGERY

## 2024-01-03 PROCEDURE — 120N000001 HC R&B MED SURG/OB

## 2024-01-03 PROCEDURE — 250N000011 HC RX IP 250 OP 636

## 2024-01-03 PROCEDURE — 87086 URINE CULTURE/COLONY COUNT: CPT | Performed by: STUDENT IN AN ORGANIZED HEALTH CARE EDUCATION/TRAINING PROGRAM

## 2024-01-03 PROCEDURE — C1713 ANCHOR/SCREW BN/BN,TIS/BN: HCPCS | Performed by: ORTHOPAEDIC SURGERY

## 2024-01-03 DEVICE — LAG SCREW
Type: IMPLANTABLE DEVICE | Site: HIP | Status: FUNCTIONAL
Brand: GAMMA

## 2024-01-03 DEVICE — LOCKING SCREW
Type: IMPLANTABLE DEVICE | Site: HIP | Status: FUNCTIONAL
Brand: T2 ALPHA

## 2024-01-03 DEVICE — K-WIRE: Type: IMPLANTABLE DEVICE | Site: HIP | Status: FUNCTIONAL

## 2024-01-03 DEVICE — TROCHANTERIC NAIL
Type: IMPLANTABLE DEVICE | Site: HIP | Status: FUNCTIONAL
Brand: GAMMA

## 2024-01-03 RX ORDER — FENTANYL CITRATE 0.05 MG/ML
50 INJECTION, SOLUTION INTRAMUSCULAR; INTRAVENOUS EVERY 5 MIN PRN
Status: DISCONTINUED | OUTPATIENT
Start: 2024-01-03 | End: 2024-01-03 | Stop reason: HOSPADM

## 2024-01-03 RX ORDER — CEFAZOLIN SODIUM/WATER 2 G/20 ML
SYRINGE (ML) INTRAVENOUS PRN
Status: DISCONTINUED | OUTPATIENT
Start: 2024-01-03 | End: 2024-01-03

## 2024-01-03 RX ORDER — ASPIRIN 81 MG/1
81 TABLET ORAL 2 TIMES DAILY
Status: DISCONTINUED | OUTPATIENT
Start: 2024-01-03 | End: 2024-01-06 | Stop reason: HOSPADM

## 2024-01-03 RX ORDER — BUPIVACAINE HYDROCHLORIDE 5 MG/ML
INJECTION, SOLUTION EPIDURAL; INTRACAUDAL
Status: DISCONTINUED
Start: 2024-01-03 | End: 2024-01-03 | Stop reason: HOSPADM

## 2024-01-03 RX ORDER — DEXAMETHASONE SODIUM PHOSPHATE 4 MG/ML
INJECTION, SOLUTION INTRA-ARTICULAR; INTRALESIONAL; INTRAMUSCULAR; INTRAVENOUS; SOFT TISSUE PRN
Status: DISCONTINUED | OUTPATIENT
Start: 2024-01-03 | End: 2024-01-03

## 2024-01-03 RX ORDER — FAMOTIDINE 20 MG/1
20 TABLET, FILM COATED ORAL DAILY
Status: DISCONTINUED | OUTPATIENT
Start: 2024-01-03 | End: 2024-01-06 | Stop reason: HOSPADM

## 2024-01-03 RX ORDER — ACETAMINOPHEN 325 MG/1
650 TABLET ORAL EVERY 4 HOURS PRN
Status: DISCONTINUED | OUTPATIENT
Start: 2024-01-06 | End: 2024-01-03

## 2024-01-03 RX ORDER — BUPIVACAINE HYDROCHLORIDE AND EPINEPHRINE 5; 5 MG/ML; UG/ML
INJECTION, SOLUTION PERINEURAL PRN
Status: DISCONTINUED | OUTPATIENT
Start: 2024-01-03 | End: 2024-01-03 | Stop reason: HOSPADM

## 2024-01-03 RX ORDER — HYDROMORPHONE HCL IN WATER/PF 6 MG/30 ML
0.2 PATIENT CONTROLLED ANALGESIA SYRINGE INTRAVENOUS
Status: DISCONTINUED | OUTPATIENT
Start: 2024-01-03 | End: 2024-01-06 | Stop reason: HOSPADM

## 2024-01-03 RX ORDER — ONDANSETRON 2 MG/ML
INJECTION INTRAMUSCULAR; INTRAVENOUS PRN
Status: DISCONTINUED | OUTPATIENT
Start: 2024-01-03 | End: 2024-01-03

## 2024-01-03 RX ORDER — SODIUM CHLORIDE, SODIUM LACTATE, POTASSIUM CHLORIDE, CALCIUM CHLORIDE 600; 310; 30; 20 MG/100ML; MG/100ML; MG/100ML; MG/100ML
INJECTION, SOLUTION INTRAVENOUS CONTINUOUS
Status: DISCONTINUED | OUTPATIENT
Start: 2024-01-03 | End: 2024-01-03 | Stop reason: HOSPADM

## 2024-01-03 RX ORDER — ONDANSETRON 2 MG/ML
4 INJECTION INTRAMUSCULAR; INTRAVENOUS EVERY 6 HOURS PRN
Status: DISCONTINUED | OUTPATIENT
Start: 2024-01-03 | End: 2024-01-03

## 2024-01-03 RX ORDER — FENTANYL CITRATE 50 UG/ML
INJECTION, SOLUTION INTRAMUSCULAR; INTRAVENOUS PRN
Status: DISCONTINUED | OUTPATIENT
Start: 2024-01-03 | End: 2024-01-03

## 2024-01-03 RX ORDER — LIDOCAINE HYDROCHLORIDE 20 MG/ML
INJECTION, SOLUTION INFILTRATION; PERINEURAL PRN
Status: DISCONTINUED | OUTPATIENT
Start: 2024-01-03 | End: 2024-01-03

## 2024-01-03 RX ORDER — PROPOFOL 10 MG/ML
INJECTION, EMULSION INTRAVENOUS PRN
Status: DISCONTINUED | OUTPATIENT
Start: 2024-01-03 | End: 2024-01-03

## 2024-01-03 RX ORDER — MAGNESIUM HYDROXIDE 1200 MG/15ML
LIQUID ORAL PRN
Status: DISCONTINUED | OUTPATIENT
Start: 2024-01-03 | End: 2024-01-03 | Stop reason: HOSPADM

## 2024-01-03 RX ORDER — ONDANSETRON 4 MG/1
4 TABLET, ORALLY DISINTEGRATING ORAL EVERY 6 HOURS PRN
Status: DISCONTINUED | OUTPATIENT
Start: 2024-01-03 | End: 2024-01-03

## 2024-01-03 RX ORDER — EPINEPHRINE 1 MG/ML
INJECTION, SOLUTION INTRAMUSCULAR; SUBCUTANEOUS
Status: DISCONTINUED
Start: 2024-01-03 | End: 2024-01-03 | Stop reason: HOSPADM

## 2024-01-03 RX ORDER — CEFTRIAXONE 1 G/1
INJECTION, POWDER, FOR SOLUTION INTRAMUSCULAR; INTRAVENOUS PRN
Status: DISCONTINUED | OUTPATIENT
Start: 2024-01-03 | End: 2024-01-03

## 2024-01-03 RX ORDER — PROCHLORPERAZINE MALEATE 5 MG
5 TABLET ORAL EVERY 6 HOURS PRN
Status: DISCONTINUED | OUTPATIENT
Start: 2024-01-03 | End: 2024-01-06 | Stop reason: HOSPADM

## 2024-01-03 RX ORDER — ACETAMINOPHEN 325 MG/1
975 TABLET ORAL EVERY 8 HOURS
Status: COMPLETED | OUTPATIENT
Start: 2024-01-03 | End: 2024-01-06

## 2024-01-03 RX ORDER — SODIUM CHLORIDE, SODIUM LACTATE, POTASSIUM CHLORIDE, CALCIUM CHLORIDE 600; 310; 30; 20 MG/100ML; MG/100ML; MG/100ML; MG/100ML
INJECTION, SOLUTION INTRAVENOUS CONTINUOUS
Status: DISCONTINUED | OUTPATIENT
Start: 2024-01-03 | End: 2024-01-06 | Stop reason: HOSPADM

## 2024-01-03 RX ORDER — ONDANSETRON 2 MG/ML
4 INJECTION INTRAMUSCULAR; INTRAVENOUS EVERY 30 MIN PRN
Status: DISCONTINUED | OUTPATIENT
Start: 2024-01-03 | End: 2024-01-03 | Stop reason: HOSPADM

## 2024-01-03 RX ORDER — LIDOCAINE 40 MG/G
CREAM TOPICAL
Status: DISCONTINUED | OUTPATIENT
Start: 2024-01-03 | End: 2024-01-03

## 2024-01-03 RX ORDER — ONDANSETRON 4 MG/1
4 TABLET, ORALLY DISINTEGRATING ORAL EVERY 30 MIN PRN
Status: DISCONTINUED | OUTPATIENT
Start: 2024-01-03 | End: 2024-01-03 | Stop reason: HOSPADM

## 2024-01-03 RX ORDER — FENTANYL CITRATE 0.05 MG/ML
25 INJECTION, SOLUTION INTRAMUSCULAR; INTRAVENOUS EVERY 5 MIN PRN
Status: DISCONTINUED | OUTPATIENT
Start: 2024-01-03 | End: 2024-01-03 | Stop reason: HOSPADM

## 2024-01-03 RX ORDER — HYDROMORPHONE HCL IN WATER/PF 6 MG/30 ML
0.1 PATIENT CONTROLLED ANALGESIA SYRINGE INTRAVENOUS
Status: DISCONTINUED | OUTPATIENT
Start: 2024-01-03 | End: 2024-01-06 | Stop reason: HOSPADM

## 2024-01-03 RX ORDER — BISACODYL 10 MG
10 SUPPOSITORY, RECTAL RECTAL DAILY PRN
Status: DISCONTINUED | OUTPATIENT
Start: 2024-01-03 | End: 2024-01-06 | Stop reason: HOSPADM

## 2024-01-03 RX ORDER — POLYETHYLENE GLYCOL 3350 17 G/17G
17 POWDER, FOR SOLUTION ORAL DAILY
Status: DISCONTINUED | OUTPATIENT
Start: 2024-01-04 | End: 2024-01-06 | Stop reason: HOSPADM

## 2024-01-03 RX ORDER — HYDROMORPHONE HCL IN WATER/PF 6 MG/30 ML
0.4 PATIENT CONTROLLED ANALGESIA SYRINGE INTRAVENOUS EVERY 5 MIN PRN
Status: DISCONTINUED | OUTPATIENT
Start: 2024-01-03 | End: 2024-01-03 | Stop reason: HOSPADM

## 2024-01-03 RX ORDER — CEFAZOLIN SODIUM 1 G/3ML
1 INJECTION, POWDER, FOR SOLUTION INTRAMUSCULAR; INTRAVENOUS EVERY 8 HOURS
Qty: 10 ML | Refills: 0 | Status: COMPLETED | OUTPATIENT
Start: 2024-01-03 | End: 2024-01-04

## 2024-01-03 RX ORDER — OXYCODONE HYDROCHLORIDE 5 MG/1
5 TABLET ORAL EVERY 4 HOURS PRN
Status: DISCONTINUED | OUTPATIENT
Start: 2024-01-03 | End: 2024-01-03

## 2024-01-03 RX ORDER — AMOXICILLIN 250 MG
1 CAPSULE ORAL 2 TIMES DAILY
Status: DISCONTINUED | OUTPATIENT
Start: 2024-01-03 | End: 2024-01-06 | Stop reason: HOSPADM

## 2024-01-03 RX ORDER — HYDROMORPHONE HCL IN WATER/PF 6 MG/30 ML
0.2 PATIENT CONTROLLED ANALGESIA SYRINGE INTRAVENOUS EVERY 5 MIN PRN
Status: DISCONTINUED | OUTPATIENT
Start: 2024-01-03 | End: 2024-01-03 | Stop reason: HOSPADM

## 2024-01-03 RX ADMIN — PAROXETINE HYDROCHLORIDE 20 MG: 20 TABLET, FILM COATED ORAL at 21:30

## 2024-01-03 RX ADMIN — LIDOCAINE HYDROCHLORIDE 80 MG: 20 INJECTION, SOLUTION INFILTRATION; PERINEURAL at 12:48

## 2024-01-03 RX ADMIN — TRANEXAMIC ACID 1 G: 10 INJECTION, SOLUTION INTRAVENOUS at 13:26

## 2024-01-03 RX ADMIN — CEFTRIAXONE 1 G: 1 INJECTION, POWDER, FOR SOLUTION INTRAMUSCULAR; INTRAVENOUS at 13:14

## 2024-01-03 RX ADMIN — PHENYLEPHRINE HYDROCHLORIDE 100 MCG: 10 INJECTION INTRAVENOUS at 12:58

## 2024-01-03 RX ADMIN — FENTANYL CITRATE 25 MCG: 50 INJECTION INTRAMUSCULAR; INTRAVENOUS at 12:44

## 2024-01-03 RX ADMIN — PHENYLEPHRINE HYDROCHLORIDE 100 MCG: 10 INJECTION INTRAVENOUS at 12:54

## 2024-01-03 RX ADMIN — FAMOTIDINE 20 MG: 20 TABLET ORAL at 15:58

## 2024-01-03 RX ADMIN — PHENYLEPHRINE HYDROCHLORIDE 100 MCG: 10 INJECTION INTRAVENOUS at 13:27

## 2024-01-03 RX ADMIN — FENTANYL CITRATE 25 MCG: 50 INJECTION INTRAMUSCULAR; INTRAVENOUS at 12:48

## 2024-01-03 RX ADMIN — PHENYLEPHRINE HYDROCHLORIDE 100 MCG: 10 INJECTION INTRAVENOUS at 13:22

## 2024-01-03 RX ADMIN — ACETAMINOPHEN 975 MG: 325 TABLET, FILM COATED ORAL at 15:58

## 2024-01-03 RX ADMIN — CEFAZOLIN 1 G: 1 INJECTION, POWDER, FOR SOLUTION INTRAMUSCULAR; INTRAVENOUS at 21:21

## 2024-01-03 RX ADMIN — FENTANYL CITRATE 25 MCG: 50 INJECTION INTRAMUSCULAR; INTRAVENOUS at 13:06

## 2024-01-03 RX ADMIN — SUGAMMADEX 200 MG: 100 INJECTION, SOLUTION INTRAVENOUS at 13:34

## 2024-01-03 RX ADMIN — ASPIRIN 81 MG: 81 TABLET, COATED ORAL at 21:27

## 2024-01-03 RX ADMIN — ROCURONIUM BROMIDE 50 MG: 50 INJECTION, SOLUTION INTRAVENOUS at 12:49

## 2024-01-03 RX ADMIN — Medication 2 G: at 12:40

## 2024-01-03 RX ADMIN — SODIUM CHLORIDE, POTASSIUM CHLORIDE, SODIUM LACTATE AND CALCIUM CHLORIDE: 600; 310; 30; 20 INJECTION, SOLUTION INTRAVENOUS at 12:40

## 2024-01-03 RX ADMIN — ACETAMINOPHEN 975 MG: 325 TABLET, FILM COATED ORAL at 22:05

## 2024-01-03 RX ADMIN — DOCUSATE SODIUM 50 MG AND SENNOSIDES 8.6 MG 1 TABLET: 8.6; 5 TABLET, FILM COATED ORAL at 21:28

## 2024-01-03 RX ADMIN — ONDANSETRON 4 MG: 2 INJECTION INTRAMUSCULAR; INTRAVENOUS at 13:26

## 2024-01-03 RX ADMIN — PROPOFOL 100 MG: 10 INJECTION, EMULSION INTRAVENOUS at 12:48

## 2024-01-03 RX ADMIN — DOCUSATE SODIUM 50 MG AND SENNOSIDES 8.6 MG 1 TABLET: 8.6; 5 TABLET, FILM COATED ORAL at 21:25

## 2024-01-03 RX ADMIN — DEXAMETHASONE SODIUM PHOSPHATE 4 MG: 4 INJECTION, SOLUTION INTRA-ARTICULAR; INTRALESIONAL; INTRAMUSCULAR; INTRAVENOUS; SOFT TISSUE at 12:55

## 2024-01-03 RX ADMIN — PHENYLEPHRINE HYDROCHLORIDE 100 MCG: 10 INJECTION INTRAVENOUS at 13:03

## 2024-01-03 RX ADMIN — TRANEXAMIC ACID 1 G: 10 INJECTION, SOLUTION INTRAVENOUS at 13:05

## 2024-01-03 ASSESSMENT — ACTIVITIES OF DAILY LIVING (ADL)
ADLS_ACUITY_SCORE: 55
ADLS_ACUITY_SCORE: 51
ADLS_ACUITY_SCORE: 41
ADLS_ACUITY_SCORE: 55
ADLS_ACUITY_SCORE: 43
ADLS_ACUITY_SCORE: 55
ADLS_ACUITY_SCORE: 55
ADLS_ACUITY_SCORE: 51
ADLS_ACUITY_SCORE: 55
ADLS_ACUITY_SCORE: 55
ADLS_ACUITY_SCORE: 41
ADLS_ACUITY_SCORE: 51

## 2024-01-03 ASSESSMENT — COPD QUESTIONNAIRES: COPD: 1

## 2024-01-03 NOTE — OP NOTE
Orthopaedic Surgery Operative Note     Patient: Mariah Winslow  : 1945  Date of Service: 1/3/2024 1:51 PM    Pre-operative Diagnosis:    Right intertrochanteric femur fracture    Post-operative Diagnosis:    same    Procedure(s):    Right femur open reduction intramedullary nail  Ordering, taking, interpretation of intraoperative X-rays right femur ap/lat    Surgeon:  Neo Pearson MD   Assistant(s):  Judy SALDANA, assistance was required for patient positioning, retraction, and wound closure    Anesthesia: General and Local  Estimated Blood Loss:  25 mL  Findings:  see operative note  Complications:  none  Implants:    Implant Name Type Inv. Item Serial No.  Lot No. LRB No. Used Action   IMP WIRE WON [id number] 1210-6450S - [id number]  IMP WIRE WON [id number] 1210-6450S  [employer] [id number] Right 1 Used as a Supply   WIRE FX 3MM 285MM KRSH STRL LF - [id number]  WIRE FX 3MM 285MM KRSH STRL LF  [employer] [id number] Right 1 Used as a Supply   NAIL TROCHANTERIC GAMMA [id number] 125DEG 8125-1170S - [id number] [employer]/[place] NAIL TROCHANTERIC GAMMA [id number] 125DEG 8125-1170S  [employer] [id number] Right 1 Implanted   SCREW BONE LAG GAMMA D10.5X80MM 8160-0080S - [id number] [employer]/[place] SCREW BONE LAG GAMMA D10.5X80MM 8160-0080S  [employer] [id number] Right 1 Implanted   SCREW BN 35MM 5MM LCK STRL T2 ALPHA - [id number] [employer] Hardware/[place] SCREW BN 35MM 5MM LCK STRL T2 ALPHA  [employer] [id number] Right 1 Implanted     Condition:  Stable    Indications:  Mariah Winslow is a pleasant 78 year old female with a history of hip fracture. The risks, benefits and alternatives to surgery were reviewed with the patient and all questions were answered to their understanding and satisfaction. Risks of surgery include, but are not limited to infection, bleeding, continued pain, neurovascular injury, fracture, loss of motion,  instability, need for future procedure, malunion, nonunion, loss of fixation.  Also reviewed were possibility of DVT, PE, cardiac arrest, loss of life or limb. Written consent was obtained from the sister by the surgeon.    Description of Procedure:    The patient was identified in preoperative holding and the correct operative extremity was marked by the surgical staff. The patient was then transferred to the operative suite. Anesthesia was administered by the anesthesia department. The patient was prepped and draped in the standard sterile fashion. Antibiotics were infused prior to surgical incision. A multidisciplinary time-out was performed, identifying the correct patient and operative extremity.     Prior to draping, reduction of the fracture was performed through traction and rotation of the leg on the table. The lateral axis of the femur was marked on skin matching the femoral anteversion with a c-arm. An incision proximal to the greater trochanter was made.  There was intussusception of the medial calcar. A wire was inserted into the neck to joystick the proximal femur into anatomic alignment. A guide pin was placed on the medial aspect of the greater trochanter and was centered on the lateral view. The guide pin was then inserted to the level of the lesser trochanter and the opening reamer was used to open the proximal aspect of the femur. At that point, the nail was inserted manually. Next, using the cephalomedullary guide jig, a guide pin was drilled into the center position on both the AP and lateral giving appropriate tip-to-apex distance for the lag screw. The guide wire was drilled over and our cephalomedullary screw was inserted. A distal interlock screw as placed. Deep fascia was closed.    Interpretation of intraoperative XR right femur ap/lat demonstrates well positioned hardware in good alignment.    Skin was closed with 3-0 monocryl, skin glue. A sterile dressings were applied. All counts were  correct. The patient was awoken from anesthesia and transferred to the PACU in stable condition.    Post op Plan:    WBAT operative extremity  Wound check in 2 weeks    Clarence Pearson MD  Orthopedic Surgery  [facility]

## 2024-01-03 NOTE — PROGRESS NOTES
[facility]    Medicine Progress Note - Hospitalist Service    Date of Admission:  1/2/2024    Assessment & Plan   Mariah Winslow is a 78 year old female with past medical history significant for dementia admitted on 1/2/2024 with a right hip fracture following a fall at her memory care facility.    Acute intertrochanteric fracture of the right femur   Patient was brought into the ER from her memory care facility with report that she was complaining of hip pain and was unable to bear weight that morning following an unwitnessed fall earlier that morning. Additional history was limited due to her dementia. Unclear mechanism.   X-ray of the pelvis and right hip showed an acute displaced and angulated right proximal femoral intratrochanteric fracture with varus alignment. CT of the pelvis showed an acute comminuted intertrochanteric fracture of the right femur with varus angulation.  Admitted to inpatient.  Orthopedic surgery consulted, appreciate their assistance.  Plan for OR for surgery today.  Bedrest and NPO prior to surgery.  As needed pain and nausea medications.  PT/OT consults after surgery.  Care transitions consult for discharge planning.    Leukocytosis   WBC elevated to 15.0 in the ER.  UA was ordered but has not been collected. No obvious signs/symptoms of focal infection noted.  WBC improved to 9.2 on 1/3/24 without intervention.  Continue to monitor for signs/symptoms of infection.    Chronic Mild Anemia   Hemoglobin 10.8 in the ER. Recent baseline was around 10.  Hemoglobin 9.8 on 1/3/24.  Recheck hemoglobin post-operatively.    Right adnexal cystic mass   CT abdomen/pelvis on 1/2/24 incidentally demonstrated an indeterminate multilocular cystic mass in the right adnexa measuring 8 cm in greatest dimension.  Have not discussed this with the patient's family yet, will discuss the next time I speak with her sister.    History of DVT   No longer on anticoagulation.  Post-op DVT  prophylaxis as directed by Orthopedic Surgery.    Dementia  Patient resides in memory care. Appears to be at baseline mental status per discussion with her sister - normally either says yes to everything or doesn't respond to questions.  Delirium precautions.  Plan of care discussed with her sister, Letty, by phone on 1/3/24.          Diet: NPO per Anesthesia Guidelines for Procedure/Surgery Except for: Meds, Ice Chips    DVT Prophylaxis: Pneumatic Compression Devices  Flores Catheter: Not present  Lines: None     Cardiac Monitoring: None  Code Status: Full Code      Clinically Significant Risk Factors Present on Admission                    # Dementia: noted on problem list               Disposition Plan     Expected Discharge Date: 01/04/2024                    Marco Antonio Giron MD  Hospitalist Service  [facility]  Securely message with [employer] info)  Text page via [facility] Paging/Directory   ______________________________________________________________________    Interval History   aMriah Winslow was seen this morning. She is pleasantly confused. Able to say yes or no to a couple questions, but mostly doesn't respond or just laughs in response to questions. Difficult to get any history from her. Does not appear to be in any discomfort or distress. Updated her sister, Letty, by phone this morning.    Physical Exam   Vital Signs: Temp: 98.9  F (37.2  C) Temp src: Axillary BP: 105/72 Pulse: 97   Resp: 14 SpO2: 94 % O2 Device: None (Room air)    Weight: 0 lbs 0 oz    Constitutional: awake, alert, cooperative, no apparent distress, laying in the hospital bed  Respiratory: no increased work of breathing, clear to auscultation bilaterally, no crackles or wheezing  Cardiovascular: regular rate and rhythm, normal S1 and S2, no murmur noted  GI: normal bowel sounds, soft, non-distended, non-tender  Skin: warm, dry  Musculoskeletal: no lower extremity pitting edema present  Neurologic:  awake, alert, unable to assess orientation, doesn't respond to most questions - sometimes will laugh a little, occasionally gives a yes/no answer but not consistently, spontaneously moves all extremities    Medical Decision Making       40 MINUTES SPENT BY ME on the date of service doing chart review, history, exam, documentation & further activities per the note.      Data     I have personally reviewed the following data over the past 24 hrs:    9.2  \   9.8 (L)   / 263     143 106 16.7 /  127 (H)   3.9 26 0.78 \       Imaging results reviewed over the past 24 hrs:   Recent Results (from the past 24 hour(s))   XR Pelvis w Hip Right 1 View    Narrative    XR PELVIS AND HIP RIGHT 1 VIEW 1/2/2024 10:54 AM     HISTORY: pain    COMPARISON: None.       Impression    IMPRESSION:   Acute, displaced and angulated right proximal femoral  intratrochanteric fracture with varus alignment. Bones are  demineralized and there is mild degenerative change of the right hip.    Question nondisplaced age indeterminate fracture of right inferior  pubic ramus.    NOTE: ABNORMAL REPORT    THE DICTATION ABOVE DESCRIBES AN ABNORMAL REPORT FOR WHICH FOLLOW-UP  IS NEEDED.    CAMELIA MCKENZIE MD         SYSTEM ID:  [id number]   CT Pelvis Bone wo Contrast    Narrative    CT PELVIS BONE WITHOUT CONTRAST  1/2/2024 12:55 PM    INDICATION: Right proximal femur fracture. Possible pelvic fracture.    COMPARISON: 1/2/2024 radiographs.    TECHNIQUE: Noncontrast. Axial, sagittal and coronal thin-section  reconstruction. Dose reduction techniques were used.   CONTRAST: None.    FINDINGS:   BONES AND JOINTS:  -Acute comminuted intratrochanteric fracture of the right femur.  Medial impaction and moderate varus angulation.  -Old healed fracture of the right inferior pubic ramus.  -Mild right hip degenerative arthrosis.  -Moderate lower lumbar facet arthrosis. Mild bilateral sacroiliac  degenerative arthrosis. Minimal left hip degenerative arthrosis.  -Mild  bone demineralization.    MUSCULATURE AND SOFT TISSUES:  -Indeterminate multilocular cystic lesion in the right adnexa,  measuring 8 cm in greatest dimension.  -Mild enlargement of the right vastus medialis and lateralis  musculature adjacent to the fracture.  -No soft tissue fluid collection.  -Advanced atrophy of the right inferior rectus abdominis muscle.       Impression    IMPRESSION:  1.  Acute comminuted intertrochanteric fracture of the right femur  with varus angulation.  2.  Old healed fracture of the right inferior pubic ramus.  3.  Indeterminate multilocular cystic mass in the right adnexa,  measuring 8 cm in greatest dimension. Gynecology consultation  recommended. This could be further characterized with ultrasound or  MRI.  4.  Remaining less significant findings detailed above.    KYLE HUYNH MD         SYSTEM ID:  [id number]

## 2024-01-03 NOTE — PLAN OF CARE
Pt arrived on floor around 1500, settled pt in bed, 1st set of vitals done, LR infusing at 50 mL/hr. Report given to oncoming nurse.

## 2024-01-03 NOTE — ANESTHESIA POSTPROCEDURE EVALUATION
Patient: Mariah Winslow    Procedure: Procedure(s):  OPEN REDUCTION INTERNAL FIXATION, FRACTURE, FEMUR, USING INTRAMEDULLARY JOSE LUIS, RIGHT       Anesthesia Type:  General    Note:  Disposition: Inpatient   Postop Pain Control: Uneventful            Sign Out: Well controlled pain   PONV: No   Neuro/Psych: Uneventful            Sign Out: Acceptable/Baseline neuro status   Airway/Respiratory: Uneventful            Sign Out: Acceptable/Baseline resp. status   CV/Hemodynamics: Uneventful            Sign Out: Acceptable CV status   Other NRE: NONE   DID A NON-ROUTINE EVENT OCCUR? No           Last vitals:  Vitals Value Taken Time   /54 01/03/24 1451   Temp 37.1  C (98.7  F) 01/03/24 1451   Pulse 86 01/03/24 1451   Resp 15 01/03/24 1451   SpO2 97 % 01/03/24 1451       Electronically Signed By: Ricky Vinson MD  January 3, 2024  4:10 PM

## 2024-01-03 NOTE — PLAN OF CARE
Mental Status: Unable to assess, pt does not respond back.  Activity/dangle: Bedrest, turn and repo  Diet: NPO  Pain: does not respond back, looks comfortable in bed  Flores/Voiding: Bladder scanned at 1100 for 274  Tele/Restraints/Iso: N/A  02/LDA: Room air. IV saline locked  D/C Date: N/A  Other Info: CHG wipes done. Pt taken to pre-op.

## 2024-01-03 NOTE — ANESTHESIA PREPROCEDURE EVALUATION
Anesthesia Pre-Procedure Evaluation    Patient: Mariah Winslow   MRN: [id number] : 1945        Procedure : Procedure(s):  OPEN REDUCTION INTERNAL FIXATION, FRACTURE, FEMUR, USING INTRAMEDULLARY JOSE LUIS, RIGHT          Past Medical History:   Diagnosis Date    Dementia (H)     Major depressive disorder, recurrent episode, moderate (H) 2014      Past Surgical History:   Procedure Laterality Date    APPENDECTOMY  1982    OOPHORECTOMY Left 1982      Allergies   Allergen Reactions    Penicillins       Social History     Tobacco Use    Smoking status: Every Day     Packs/day: 1.50     Years: 35.00     Additional pack years: 0.00     Total pack years: 52.50     Types: Cigarettes    Smokeless tobacco: Never   Substance Use Topics    Alcohol use: Yes     Comment: 2 times per week, 2 beers per time      Wt Readings from Last 1 Encounters:   19 45.6 kg (100 lb 8 oz)        Anesthesia Evaluation            ROS/MED HX  ENT/Pulmonary:     (+)                          COPD,              Neurologic: Comment: Patient resides in memory care. Appears to be at baseline mental status per discussion with her sister - normally either says yes to everything or doesn't respond to questions.    (+)   dementia,                             Cardiovascular:     (+) Dyslipidemia - -   -  - -                                      METS/Exercise Tolerance:     Hematologic: Comments: leukocytosis    (+) History of blood clots,     anemia,          Musculoskeletal:   (+)     fracture, Fracture location: RLE,         GI/Hepatic:       Renal/Genitourinary:       Endo:       Psychiatric/Substance Use:     (+) psychiatric history depression and anxiety       Infectious Disease:       Malignancy:       Other:               OUTSIDE LABS:  CBC:   Lab Results   Component Value Date    WBC 9.2 2024    WBC 15.0 (H) 2024    HGB 9.8 (L) 2024    HGB 10.8 (L) 2024    HCT 29.5 (L) 2024    HCT 31.7 (L) 2024    PLT  "263 01/03/2024     01/02/2024     BMP:   Lab Results   Component Value Date     01/03/2024     01/02/2024    POTASSIUM 3.9 01/03/2024    POTASSIUM 4.9 01/02/2024    CHLORIDE 106 01/03/2024    CHLORIDE 104 01/02/2024    CO2 26 01/03/2024    CO2 25 01/02/2024    BUN 16.7 01/03/2024    BUN 18.1 01/02/2024    CR 0.78 01/03/2024    CR 0.68 01/02/2024     (H) 01/03/2024     (H) 01/03/2024     COAGS: No results found for: \"PTT\", \"INR\", \"FIBR\"  POC: No results found for: \"BGM\", \"HCG\", \"HCGS\"  HEPATIC:   Lab Results   Component Value Date    ALBUMIN 3.9 03/09/2018    PROTTOTAL 7.4 03/09/2018    ALT 18 03/09/2018    AST 25 03/09/2018    ALKPHOS 62 03/09/2018    BILITOTAL 0.2 03/09/2018     OTHER:   Lab Results   Component Value Date    DAYA 8.9 01/03/2024    MAG 2.0 01/02/2024    TSH 1.27 03/09/2018    SED 13 03/09/2018       Anesthesia Plan    ASA Status:  3    NPO Status:  NPO Appropriate    Anesthesia Type: General.     - Airway: ETT   Induction: Intravenous, RSI, Propofol.   Maintenance: Balanced.   Techniques and Equipment:     - Airway: Video-Laryngoscope       Consents    Anesthesia Plan(s) and associated risks, benefits, and realistic alternatives discussed. Questions answered and patient/representative(s) expressed understanding.     - Discussed:     - Discussed with:  Patient            Postoperative Care    Pain management: IV analgesics, Multi-modal analgesia.   PONV prophylaxis: Ondansetron (or other 5HT-3), Dexamethasone or Solumedrol, Background Propofol Infusion     Comments:    Other Comments: No midazolam           Ricky Vinson MD    I have reviewed the pertinent notes and labs in the chart from the past 30 days and (re)examined the patient.  Any updates or changes from those notes are reflected in this note.                  "

## 2024-01-03 NOTE — PROGRESS NOTES
MDA and Surgeon aware of temporal temperature of 101.5. Ok to proceed. No orders to treat temp in pre-op. Urine collection still to be collected. Will pass along to OR as PVR on floor 275. Delay barrett collection in pre-op due to pain.

## 2024-01-03 NOTE — ANESTHESIA CARE TRANSFER NOTE
Patient: Mariah Winslow    Procedure: Procedure(s):  OPEN REDUCTION INTERNAL FIXATION, FRACTURE, FEMUR, USING INTRAMEDULLARY JOSE LUIS, RIGHT       Diagnosis: Closed displaced intertrochanteric fracture of right femur, initial encounter (H) [S72.141A]  Diagnosis Additional Information: No value filed.    Anesthesia Type:   General     Note:    Oropharynx: oropharynx clear of all foreign objects and spontaneously breathing  Level of Consciousness: drowsy  Oxygen Supplementation: face mask  Level of Supplemental Oxygen (L/min / FiO2): 6  Independent Airway: airway patency satisfactory and stable  Dentition: dentition unchanged  Vital Signs Stable: post-procedure vital signs reviewed and stable  Report to RN Given: handoff report given  Patient transferred to: PACU    Handoff Report: Identifed the Patient, Identified the Reponsible Provider, Reviewed the pertinent medical history, Discussed the surgical course, Reviewed Intra-OP anesthesia mangement and issues during anesthesia, Set expectations for post-procedure period and Allowed opportunity for questions and acknowledgement of understanding      Vitals:  Vitals Value Taken Time   /54 01/03/24 1350   Temp 36.9  C (98.5  F) 01/03/24 1350   Pulse 85 01/03/24 1356   Resp 16 01/03/24 1356   SpO2 100 % 01/03/24 1356   Vitals shown include unfiled device data.    Electronically Signed By: DASIA Martin CRNA  January 3, 2024  1:57 PM

## 2024-01-03 NOTE — ANESTHESIA PROCEDURE NOTES
Airway       Patient location during procedure: OR       Procedure Start/Stop Times: 1/3/2024 12:51 PM  Staff -        Anesthesiologist:  Ricky Vinson MD       CRNA: Birgit Odonnell APRN CRNA       Performed By: CRNA  Consent for Airway        Urgency: elective  Indications and Patient Condition       Indications for airway management: leo-procedural       Induction type:RSI       Mask difficulty assessment: 2 - vent by mask + OA or adjuvant +/- NMBA    Final Airway Details       Final airway type: endotracheal airway       Successful airway: ETT - single  Endotracheal Airway Details        ETT size (mm): 7.0       Cuffed: yes       Successful intubation technique: video laryngoscopy       VL Blade Size: Glidescope 3       Grade View of Cords: 1       Adjucts: stylet       Position: Right       Measured from: lips       Secured at (cm): 21       Bite block used: None    Post intubation assessment        Placement verified by: capnometry, equal breath sounds and chest rise        Number of attempts at approach: 1       Secured with: tape       Ease of procedure: easy       Dentition: Intact and Unchanged    Medication(s) Administered   Medication Administration Time: 1/3/2024 12:51 PM

## 2024-01-03 NOTE — PLAN OF CARE
Goal Outcome Evaluation:    Trauma/Ortho/Medical (Choose one): Ortho/ Trauma     Diagnosis: R hip fracture  POD# -  Mental Status: disoriented x4  Activity/dangle: bedrest  Diet: NPO  Pain: MINOR- non verbal, no signs of being in pain  Flores/Voiding:  Purewick  Tele/Restraints/Iso: NA  02/LDA: RA/ RADHA sl  D/C Date: TBD  Other Info: BG 0200 136 mg/dl. Schedule for ORIF today 1/3/24 at 12:50PM. CHG wipes done.

## 2024-01-03 NOTE — PLAN OF CARE
Date/Time 01/02/2024    Trauma/Ortho/Medical (Choose one)  Ortho.    Diagnosis: Right hip pain.  Mental Status: MINOR pt is non verbal pt has past history of dementia..  Activity/dangle Pt not OOB yet.  Diet: NPO after midnight.  Pain: No pain noticed.  Flores/Voiding: External catheter in placed.  Tele/Restraints/Iso: None.  02/LDA:Room air/ PIV SL.  D/C Date: Pending.  Other Info: Pt came with hip pain from fall this morning, alert and none verbal. Pt has scrape on left knee and right elbow.

## 2024-01-04 ENCOUNTER — APPOINTMENT (OUTPATIENT)
Dept: PHYSICAL THERAPY | Facility: CLINIC | Age: 79
DRG: 481 | End: 2024-01-04
Payer: MEDICARE

## 2024-01-04 LAB
ANION GAP SERPL CALCULATED.3IONS-SCNC: 7 MMOL/L (ref 7–15)
BACTERIA UR CULT: NORMAL
BUN SERPL-MCNC: 16.7 MG/DL (ref 8–23)
CALCIUM SERPL-MCNC: 8.6 MG/DL (ref 8.8–10.2)
CHLORIDE SERPL-SCNC: 106 MMOL/L (ref 98–107)
CREAT SERPL-MCNC: 0.73 MG/DL (ref 0.51–0.95)
DEPRECATED HCO3 PLAS-SCNC: 27 MMOL/L (ref 22–29)
EGFRCR SERPLBLD CKD-EPI 2021: 85 ML/MIN/1.73M2
ERYTHROCYTE [DISTWIDTH] IN BLOOD BY AUTOMATED COUNT: 13.4 % (ref 10–15)
GLUCOSE SERPL-MCNC: 115 MG/DL (ref 70–99)
HCT VFR BLD AUTO: 25.4 % (ref 35–47)
HGB BLD-MCNC: 8.4 G/DL (ref 11.7–15.7)
MCH RBC QN AUTO: 32.2 PG (ref 26.5–33)
MCHC RBC AUTO-ENTMCNC: 33.1 G/DL (ref 31.5–36.5)
MCV RBC AUTO: 97 FL (ref 78–100)
PLATELET # BLD AUTO: 223 10E3/UL (ref 150–450)
POTASSIUM SERPL-SCNC: 4.1 MMOL/L (ref 3.4–5.3)
RBC # BLD AUTO: 2.61 10E6/UL (ref 3.8–5.2)
SODIUM SERPL-SCNC: 140 MMOL/L (ref 135–145)
WBC # BLD AUTO: 12.1 10E3/UL (ref 4–11)

## 2024-01-04 PROCEDURE — 97161 PT EVAL LOW COMPLEX 20 MIN: CPT | Mod: GP | Performed by: PHYSICAL THERAPIST

## 2024-01-04 PROCEDURE — 99232 SBSQ HOSP IP/OBS MODERATE 35: CPT | Performed by: HOSPITALIST

## 2024-01-04 PROCEDURE — 120N000001 HC R&B MED SURG/OB

## 2024-01-04 PROCEDURE — 250N000011 HC RX IP 250 OP 636: Performed by: HOSPITALIST

## 2024-01-04 PROCEDURE — 250N000011 HC RX IP 250 OP 636

## 2024-01-04 PROCEDURE — 36415 COLL VENOUS BLD VENIPUNCTURE: CPT | Performed by: HOSPITALIST

## 2024-01-04 PROCEDURE — 250N000013 HC RX MED GY IP 250 OP 250 PS 637: Performed by: STUDENT IN AN ORGANIZED HEALTH CARE EDUCATION/TRAINING PROGRAM

## 2024-01-04 PROCEDURE — 97530 THERAPEUTIC ACTIVITIES: CPT | Mod: GP | Performed by: PHYSICAL THERAPIST

## 2024-01-04 PROCEDURE — 85027 COMPLETE CBC AUTOMATED: CPT | Performed by: HOSPITALIST

## 2024-01-04 PROCEDURE — 250N000013 HC RX MED GY IP 250 OP 250 PS 637

## 2024-01-04 PROCEDURE — 80048 BASIC METABOLIC PNL TOTAL CA: CPT | Performed by: HOSPITALIST

## 2024-01-04 RX ORDER — POLYETHYLENE GLYCOL 3350 17 G/17G
17 POWDER, FOR SOLUTION ORAL DAILY
DISCHARGE
Start: 2024-01-04

## 2024-01-04 RX ORDER — CEFTRIAXONE 1 G/1
1 INJECTION, POWDER, FOR SOLUTION INTRAMUSCULAR; INTRAVENOUS EVERY 24 HOURS
Status: DISCONTINUED | OUTPATIENT
Start: 2024-01-04 | End: 2024-01-05

## 2024-01-04 RX ORDER — ACETAMINOPHEN 325 MG/1
975 TABLET ORAL EVERY 8 HOURS PRN
DISCHARGE
Start: 2024-01-04 | End: 2024-01-08 | Stop reason: DRUGHIGH

## 2024-01-04 RX ORDER — HYDROXYZINE HYDROCHLORIDE 10 MG/1
10 TABLET, FILM COATED ORAL 3 TIMES DAILY PRN
DISCHARGE
Start: 2024-01-04 | End: 2024-02-20

## 2024-01-04 RX ORDER — OXYCODONE HYDROCHLORIDE 5 MG/1
2.5-5 TABLET ORAL EVERY 4 HOURS PRN
Qty: 20 TABLET | Refills: 0 | Status: SHIPPED | OUTPATIENT
Start: 2024-01-04 | End: 2024-02-20

## 2024-01-04 RX ORDER — OXYCODONE HYDROCHLORIDE 5 MG/1
2.5-5 TABLET ORAL EVERY 4 HOURS PRN
Qty: 20 TABLET | Refills: 0 | Status: SHIPPED | OUTPATIENT
Start: 2024-01-04 | End: 2024-01-04

## 2024-01-04 RX ORDER — ASPIRIN 81 MG/1
81 TABLET ORAL 2 TIMES DAILY
DISCHARGE
Start: 2024-01-04 | End: 2024-02-15

## 2024-01-04 RX ADMIN — DOCUSATE SODIUM 50 MG AND SENNOSIDES 8.6 MG 1 TABLET: 8.6; 5 TABLET, FILM COATED ORAL at 22:19

## 2024-01-04 RX ADMIN — ACETAMINOPHEN 975 MG: 325 TABLET, FILM COATED ORAL at 14:03

## 2024-01-04 RX ADMIN — DOCUSATE SODIUM 50 MG AND SENNOSIDES 8.6 MG 1 TABLET: 8.6; 5 TABLET, FILM COATED ORAL at 08:18

## 2024-01-04 RX ADMIN — PAROXETINE HYDROCHLORIDE 20 MG: 20 TABLET, FILM COATED ORAL at 22:19

## 2024-01-04 RX ADMIN — FAMOTIDINE 20 MG: 20 TABLET ORAL at 08:18

## 2024-01-04 RX ADMIN — OXYCODONE HYDROCHLORIDE 2.5 MG: 5 TABLET ORAL at 08:18

## 2024-01-04 RX ADMIN — ACETAMINOPHEN 975 MG: 325 TABLET, FILM COATED ORAL at 07:03

## 2024-01-04 RX ADMIN — ASPIRIN 81 MG: 81 TABLET, COATED ORAL at 08:18

## 2024-01-04 RX ADMIN — POLYETHYLENE GLYCOL 3350 17 G: 17 POWDER, FOR SOLUTION ORAL at 08:18

## 2024-01-04 RX ADMIN — CEFTRIAXONE SODIUM 1 G: 1 INJECTION, POWDER, FOR SOLUTION INTRAMUSCULAR; INTRAVENOUS at 09:58

## 2024-01-04 RX ADMIN — ACETAMINOPHEN 975 MG: 325 TABLET, FILM COATED ORAL at 22:16

## 2024-01-04 RX ADMIN — CEFAZOLIN 1 G: 1 INJECTION, POWDER, FOR SOLUTION INTRAMUSCULAR; INTRAVENOUS at 05:33

## 2024-01-04 RX ADMIN — ASPIRIN 81 MG: 81 TABLET, COATED ORAL at 22:19

## 2024-01-04 ASSESSMENT — ACTIVITIES OF DAILY LIVING (ADL)
ADLS_ACUITY_SCORE: 55
ADLS_ACUITY_SCORE: 53
ADLS_ACUITY_SCORE: 55
ADLS_ACUITY_SCORE: 55
ADLS_ACUITY_SCORE: 57
ADLS_ACUITY_SCORE: 53
ADLS_ACUITY_SCORE: 55
ADLS_ACUITY_SCORE: 55
ADLS_ACUITY_SCORE: 57
ADLS_ACUITY_SCORE: 55

## 2024-01-04 NOTE — PROGRESS NOTES
"Orthopedic Surgery  Mariah Winslow  01/04/2024     Admit Date:  1/2/2024    POD: 1 Day Post-Op   Procedure(s):  OPEN REDUCTION INTERNAL FIXATION, FRACTURE, FEMUR, USING INTRAMEDULLARY JOSE LUIS, RIGHT    Advanced dementia. Minimal verbal interactions. Occasional says \"yes\" but otherwise no meaningful communication.  Patient resting comfortably in bed. RN at bedside.  Patient appears comfortable.   Per RN, patient seems to have good pain control.  Voiding.   Tolerating oral intake.    VSS. Afebrile.  No acute events overnight.    Temp:  [97.6  F (36.4  C)-101.5  F (38.6  C)] 97.6  F (36.4  C)  Pulse:  [77-94] 82  Resp:  [14-21] 16  BP: (101-123)/(47-62) 117/47  SpO2:  [92 %-100 %] 93 %    Alert, smiling at times. Advanced dementia at baseline. NAD. No respiratory distress.  Right hip dressings are clean, dry, and intact.   Minimal erythema of the surrounding skin.   Moderate swelling of the right hip and thigh, compartments are soft, compressible, and seemingly nontender.   Bilateral calves are soft, seemingly non-tender.  Right lower extremity is NVI.  Patient spontaneously DF and PF the ankles and flexes and extends the toes, bilaterally, but not on command.  No grimacing with passive ankle DF and PF.  DP pulse palpable.  Sensation grossly intact bilateral lower extremities.    Labs/Imaging:  Recent Labs   Lab Test 01/04/24  0719 01/03/24  0720 01/02/24  1141   WBC 12.1* 9.2 15.0*   HGB 8.4* 9.8* 10.8*    263 289     A/P    1. S/p right intertrochanteric femur fracture ORIF using IM nail  -Continue ASA 81 mg BID for DVT prophylaxis. Monitor Hgb. Continue SCDs.   -Mobilize with PT/OT.  -WBAT RLE with walker.    -Continue current pain regimen.  -Dressings: Keep intact. Okay for RN to change if >60% saturated or peeling/falling off.   -Follow-up: 2 weeks post-op with Dr. Neo Pearson    2. Disposition  -Anticipate d/c back to memory care vs TCU when medically cleared and progressing in PT.    Bouchra Rivera, " LISA  [facility]

## 2024-01-04 NOTE — PLAN OF CARE
Goal Outcome Evaluation:    Trauma/Ortho/Medical (Choose one): Ortho     Diagnosis: ORIF L hip  POD# 1  Mental Status: Disoriented x4  Activity/dangle: not OOB / 2 lift  Diet: Regular, feeder  Pain:   Flores/Voiding:  incontinent   Tele/Restraints/Iso: NA  02/LDA: RA/ PIV infusing  D/C Date: TBD  Other Info: Dressing x2 CDI.

## 2024-01-04 NOTE — PLAN OF CARE
Mental Status: Unable to assess, Nonverbal baseline  Activity/dangle: Ast of 2 Lift  Diet: Regular diet  Pain: Managed with Oxycodone  Flores/Voiding: Incontinent  Tele/Restraints/Iso: N/A  02/LDA: Room air. IV saline locked  D/C Date: Pending placement  Other Info: Dressing CDI.

## 2024-01-04 NOTE — PROGRESS NOTES
01/04/24 0815   Appointment Info   Signing Clinician's Name / Credentials (PT) Aspen Mendez DPT   Rehab Comments (PT) WBAT R LE, no hip precautions   Living Environment   People in Home facility resident   Current Living Arrangements extended care facility   Home Accessibility wheelchair accessible   Living Environment Comments Pt from [facility] care   Self-Care   Usual Activity Tolerance good   Current Activity Tolerance poor   Regular Exercise No   Equipment Currently Used at Home none   Fall history within last six months yes   Number of times patient has fallen within last six months 1  (at least one that led to admission)   Activity/Exercise/Self-Care Comment Pt is unreliable historian; per facility [facility], patient was ambulating around unit without assistive devices independently. Per facility they do not have lift device.   General Information   Onset of Illness/Injury or Date of Surgery 01/02/24   Referring Physician Judy Fuller PA-C   Patient/Family Therapy Goals Statement (PT) none stated   Pertinent History of Current Problem (include personal factors and/or comorbidities that impact the POC) fall at [facility] with R femur fracture s/p R hip ORIF   Weight-Bearing Status - RLE weight-bearing as tolerated   General Observations no hip precautions   Cognition   Cognitive Status Comments alert but disoriented x 4 with poor command follow; baseline advanced dementia   Pain Assessment   Patient Currently in Pain   (non-verbal signs of pain with movement; pre-medicated prior to PT session.)   Integumentary/Edema   Integumentary/Edema Comments R hip lateral incision   Range of Motion (ROM)   Range of Motion ROM deficits secondary to pain;ROM deficits secondary to surgical procedure   Strength (Manual Muscle Testing)   Strength (Manual Muscle Testing) Deficits observed during functional mobility   Strength Comments requires A x 2 to stand d/t B LE pain and weakness   Bed Mobility   Comment, (Bed  Mobility) Max A x 1   Transfers   Comment, (Transfers) Max A x 2 for sit <> stand   Gait/Stairs (Locomotion)   Distance in Feet (Gait) unable to take steps d/t pain and weakness   Balance   Balance Comments high fall risk; requires A x 2 for standing at EOB   Clinical Impression   Criteria for Skilled Therapeutic Intervention Yes, treatment indicated   PT Diagnosis (PT) impaired mobility   Influenced by the following impairments pain, weakness, impaired ROM, impaired balance   Functional limitations due to impairments fall risk, decreased activity tolerance   Clinical Presentation (PT Evaluation Complexity) stable   Clinical Presentation Rationale clinical judgement   Clinical Decision Making (Complexity) low complexity   Planned Therapy Interventions (PT) balance training;bed mobility training;gait training;neuromuscular re-education;patient/family education;ROM (range of motion);strengthening;transfer training   Risk & Benefits of therapy have been explained evaluation/treatment results reviewed;care plan/treatment goals reviewed;risks/benefits reviewed;current/potential barriers reviewed;participants voiced agreement with care plan;participants included;patient   PT Total Evaluation Time   PT Eval, Low Complexity Minutes (69767) 15   Physical Therapy Goals   PT Frequency 5x/week   PT Predicted Duration/Target Date for Goal Attainment 01/12/24   PT Goals Bed Mobility;Transfers;Gait   PT: Bed Mobility Minimal assist;Supine to/from sit   PT: Transfers Minimal assist;Sit to/from stand;Bed to/from chair;Assistive device   PT: Gait Minimal assist;Assistive device;25 feet   Interventions   Interventions Quick Adds Therapeutic Activity   Therapeutic Activity   Therapeutic Activities: dynamic activities to improve functional performance Minutes (34108) 30   Symptoms Noted During/After Treatment Increased pain   Treatment Detail/Skilled Intervention Non-verbal signs of increaed pain such as tensing with movement and  pulling backwards at times in sitting and standing. Pt responds to tactile cues and remains alert and mostly pleasant during session. Max A x 1 for supine <> sit with HOB elevated, pt reaching out for bed rail and hand over hand help to maintain position and assist with pushing to sit. Sitting EOB x 10 mins with fluctuating need of assist from Max to CGA following increased time and improving tolerance to sitting. Manual cues for widening CANDICE on floor and scooting towards EOB for improved balance with less posterior push noted. Performed sit <> stand with FWW Max A x 2, pt able to stand for 30 seconds before sitting with Mod A x 2. LEs remain close togehter and pt periodically picks up either leg increasing unsteadiness. Trialed mir steady but pt resistant and unable to stand despite Max A x2 and elevated bed height. Used lift in sitting for lift transfer bed <> chair. Positioned in chair for optimal posture and pressure relief, step stool at feet for upright, comfortable posture for meal time. Vitals monitored: 114/43 mmHg, 96% spO2 on room air, 72 bpm while sitting up in recliner. Chair alarm on, RN alerted of currently location/assist level.   PT Discharge Planning   PT Plan lift to chair, progress with sit <> stand and pivot transfers, pre-gait and gait eventually. Did better with FWW vs. SS today but could try either   PT Discharge Recommendation (DC Rec) Transitional Care Facility   PT Rationale for DC Rec Pt normally independently ambulates around memory care without device; currently needing A x 2 for sit <> stand transfer, lift for bed <> chair transfers; today pt is unable to pivot transfer or ambulate d/t R LE pain and weakness following R LE fracture and ORIF surgery. Currently recommend pt d/t to TCU and would be best treated at memory care TCU. If placement is difficult and/or patient progresses to transfer with Ax1 could consider back to memory care facility with walker/wheelchair, increased staff  support and HHPT to continue rehabiliation after R leg fracture and surgery.   PT Brief overview of current status Lift for transfers. Max A bed mob, Max A x 2 to stand EOB.   Total Session Time   Timed Code Treatment Minutes 30   Total Session Time (sum of timed and untimed services) 45

## 2024-01-04 NOTE — PROGRESS NOTES
Occupational Therapy: Orders received. Chart reviewed and discussed with care team.?Pt admitted due to fall at [facility] with R femur fracture s/p R hip ORIF. Spoke with PT. Plan to discharge pt to TCU. Defer any OT needs to TCU. Defer discharge recommendations to medical team and/or PT.? Will complete orders.

## 2024-01-04 NOTE — PROGRESS NOTES
[facility]    Medicine Progress Note - Hospitalist Service    Date of Admission:  1/2/2024    Assessment & Plan   Mariah Winslow is a 78 year old female with past medical history significant for dementia admitted on 1/2/2024 with a right hip fracture following a fall at her memory care facility.    Acute intertrochanteric fracture of the right femur   Patient was brought into the ER from her memory care facility with report that she was complaining of hip pain and was unable to bear weight that morning following an unwitnessed fall earlier that morning. Additional history was limited due to her dementia. Unclear mechanism.   X-ray of the pelvis and right hip showed an acute displaced and angulated right proximal femoral intratrochanteric fracture with varus alignment. CT of the pelvis showed an acute comminuted intertrochanteric fracture of the right femur with varus angulation.  Admitted to inpatient.  Orthopedic surgery consulted, appreciate their assistance.  S/p right femur open reduction intramedullary nail on 1/3/24 by Dr. Pearson.  Post-op management including therapies, pain control, DVT prophylaxis, etc. as directed by Orthopedic Surgery.  PT/OT consulted.  Care transitions consulted for discharge planning.    UTI  WBC elevated to 15.0 in the ER.  UA was ordered in the ER, but not collected until 1/3/24, concerning for infection.  Unable to assess for symptoms due to her dementia.  WBC improved to 9.2 on 1/3/24 without intervention, but then back up to 12.1 on 1/4/24.  Fever up to 101.5 on 1/3/24.  Received IV ancef on 1/3/24, will start IV rocephin on 1/4/24.  Urine culture pending.    Chronic mild anemia   Hemoglobin 10.8 in the ER. Recent baseline was around 10.  Hemoglobin 9.8 on 1/3/24 and then 8.4 on 1/4/24.  No obvious bleeding noted. EBL 25 mL during surgery.  Recheck in AM.    Right adnexal cystic mass   CT abdomen/pelvis on 1/2/24 incidentally demonstrated an  "indeterminate multilocular cystic mass in the right adnexa measuring 8 cm in greatest dimension.  Need to discuss with patient's family, consider outpatient follow-up/evaluation if within goals of care.    History of DVT   No longer on anticoagulation.  Post-op DVT prophylaxis as directed by Orthopedic Surgery, currently on aspirin 81 mg BID.    Dementia  Patient resides in memory care. Appears to be at baseline mental status per discussion with her sister - normally either says yes to everything or doesn't respond to questions.  Delirium precautions.  Plan of care discussed with her sister, Letty, by phone on 1/4/24.          Diet: Advance Diet as Tolerated: Regular Diet Adult    DVT Prophylaxis: Pneumatic Compression Devices and Aspirin  Flores Catheter: Not present  Lines: None     Cardiac Monitoring: None  Code Status: Full Code      Clinically Significant Risk Factors                      # Dementia: noted on problem list    # Cachexia: Estimated body mass index is 17.71 kg/m  as calculated from the following:    Height as of this encounter: 1.6 m (5' 3\").    Weight as of this encounter: 45.4 kg (100 lb)., PRESENT ON ADMISSION            Disposition Plan     Expected Discharge Date: 01/04/2024                    Marco Antonio Giron MD  Hospitalist Service  [facility]  Securely message with [employer] (more info)  Text page via [employer] Paging/Directory   ______________________________________________________________________    Interval History   Mariah Winslow was seen this morning.  She was sitting up in a chair eating breakfast.  Got up to the chair with the left.  She appears comfortable.  Did not really answer any of my questions, just smiled and chuckled in response to a few of the questions otherwise did not respond at all.  Updated her sister Letty by phone this morning.    Physical Exam   Vital Signs: Temp: 97.6  F (36.4  C) Temp src: Oral BP: 117/47 Pulse: 82   Resp: 16 SpO2: 93 % " O2 Device: None (Room air) Oxygen Delivery: 2 LPM  Weight: 100 lbs 0 oz    Constitutional: awake, alert, cooperative, no apparent distress, sitting up in a chair  Respiratory: no increased work of breathing, clear to auscultation bilaterally, no crackles or wheezing  Cardiovascular: regular rate and rhythm, normal S1 and S2, no murmur noted  GI: normal bowel sounds, soft, non-distended, non-tender  Skin: warm, dry  Musculoskeletal: no lower extremity pitting edema present  Neurologic: awake, alert, doesn't most of my questions or consistently follow commands    Medical Decision Making       40 MINUTES SPENT BY ME on the date of service doing chart review, history, exam, documentation & further activities per the note.      Data     I have personally reviewed the following data over the past 24 hrs:    12.1 (H)  \   8.4 (L)   / 223     140 106 16.7 /  115 (H)   4.1 27 0.73 \       Imaging results reviewed over the past 24 hrs:   Recent Results (from the past 24 hour(s))   XR Surgery ARIA L/T 5 Min Fluoro w Stills    Narrative    XR SURGERY ARIA FLUORO LESS THAN 5 MIN W STILLS 1/3/2024 1:33 PM     COMPARISON: None    HISTORY: ORIF Right Hip. C-arm #3. 1 min and 4 sec fluoro time.  [id number]. 4 images    NUMBER OF IMAGES ACQUIRED: 4    VIEWS: 4 views of the hip and femur    FLUOROSCOPY TIME: 1.1 minute(s)      Impression    IMPRESSION: Fluoroscopic images show changes of IM greta and dynamic  screw fixation of an intertrochanteric fracture of the hip. This is in  good anatomic alignment although there is some distraction of the  lesser trochanter fracture fragment    ONESIMO RAMIREZ MD         SYSTEM ID:  [id number]

## 2024-01-04 NOTE — PLAN OF CARE
Date/Time 01/03/2023    Trauma/Ortho/Medical (Choose one)  Ortho.    Diagnosis: ORIF of right femur.  POD#:0  Mental Status: A but none verbal.  Activity/dangle Assist of 2 with lift.  Diet: Regular.  Pain: No pain noticed.  Flores/Voiding: incontinent.  Tele/Restraints/Iso: Room air/ PIV   02/LDA: Room air / PIV  LR infusing 50ml/hr  D/C Date: Pending.  Other Info: Pt is alert but non barbel, dressing clean dry and intact.

## 2024-01-05 ENCOUNTER — APPOINTMENT (OUTPATIENT)
Dept: PHYSICAL THERAPY | Facility: CLINIC | Age: 79
DRG: 481 | End: 2024-01-05
Payer: MEDICARE

## 2024-01-05 LAB
GLUCOSE BLDC GLUCOMTR-MCNC: 114 MG/DL (ref 70–99)
HGB BLD-MCNC: 8.3 G/DL (ref 11.7–15.7)

## 2024-01-05 PROCEDURE — 250N000011 HC RX IP 250 OP 636: Performed by: HOSPITALIST

## 2024-01-05 PROCEDURE — 99232 SBSQ HOSP IP/OBS MODERATE 35: CPT | Performed by: HOSPITALIST

## 2024-01-05 PROCEDURE — 250N000013 HC RX MED GY IP 250 OP 250 PS 637: Performed by: STUDENT IN AN ORGANIZED HEALTH CARE EDUCATION/TRAINING PROGRAM

## 2024-01-05 PROCEDURE — 97110 THERAPEUTIC EXERCISES: CPT | Mod: GP | Performed by: PHYSICAL THERAPIST

## 2024-01-05 PROCEDURE — 36415 COLL VENOUS BLD VENIPUNCTURE: CPT

## 2024-01-05 PROCEDURE — 85018 HEMOGLOBIN: CPT

## 2024-01-05 PROCEDURE — 250N000013 HC RX MED GY IP 250 OP 250 PS 637

## 2024-01-05 PROCEDURE — 120N000001 HC R&B MED SURG/OB

## 2024-01-05 PROCEDURE — 97530 THERAPEUTIC ACTIVITIES: CPT | Mod: GP | Performed by: PHYSICAL THERAPIST

## 2024-01-05 RX ADMIN — DOCUSATE SODIUM 50 MG AND SENNOSIDES 8.6 MG 1 TABLET: 8.6; 5 TABLET, FILM COATED ORAL at 21:41

## 2024-01-05 RX ADMIN — DOCUSATE SODIUM 50 MG AND SENNOSIDES 8.6 MG 1 TABLET: 8.6; 5 TABLET, FILM COATED ORAL at 09:17

## 2024-01-05 RX ADMIN — ACETAMINOPHEN 975 MG: 325 TABLET, FILM COATED ORAL at 06:11

## 2024-01-05 RX ADMIN — POLYETHYLENE GLYCOL 3350 17 G: 17 POWDER, FOR SOLUTION ORAL at 09:17

## 2024-01-05 RX ADMIN — ACETAMINOPHEN 975 MG: 325 TABLET, FILM COATED ORAL at 21:41

## 2024-01-05 RX ADMIN — PAROXETINE HYDROCHLORIDE 20 MG: 20 TABLET, FILM COATED ORAL at 21:41

## 2024-01-05 RX ADMIN — ACETAMINOPHEN 975 MG: 325 TABLET, FILM COATED ORAL at 15:17

## 2024-01-05 RX ADMIN — ASPIRIN 81 MG: 81 TABLET, COATED ORAL at 21:41

## 2024-01-05 RX ADMIN — ASPIRIN 81 MG: 81 TABLET, COATED ORAL at 09:17

## 2024-01-05 RX ADMIN — FAMOTIDINE 20 MG: 20 TABLET ORAL at 09:17

## 2024-01-05 RX ADMIN — CEFTRIAXONE SODIUM 1 G: 1 INJECTION, POWDER, FOR SOLUTION INTRAMUSCULAR; INTRAVENOUS at 09:17

## 2024-01-05 ASSESSMENT — ACTIVITIES OF DAILY LIVING (ADL)
ADLS_ACUITY_SCORE: 53
ADLS_ACUITY_SCORE: 55
ADLS_ACUITY_SCORE: 60
ADLS_ACUITY_SCORE: 54
ADLS_ACUITY_SCORE: 60
ADLS_ACUITY_SCORE: 60
ADLS_ACUITY_SCORE: 55
ADLS_ACUITY_SCORE: 55
ADLS_ACUITY_SCORE: 60

## 2024-01-05 NOTE — PROGRESS NOTES
Orthopedic Surgery  Mariah Winslow  01/05/2024     Admit Date:  1/2/2024    POD: 2 Days Post-Op   Procedure(s):  OPEN REDUCTION INTERNAL FIXATION, FRACTURE, FEMUR, USING INTRAMEDULLARY JOSE LUIS, RIGHT    Advanced dementia. Alert to self  Patient resting comfortably in bed.   Patient appears comfortable.   Tolerating oral intake.    No acute events overnight.    Temp:  [97.8  F (36.6  C)-98.8  F (37.1  C)] 98.8  F (37.1  C)  Pulse:  [75-83] 81  Resp:  [16] 16  BP: (100-143)/(50-62) 100/50  SpO2:  [92 %-96 %] 96 %    Alert to self.   Right hip dressings are clean, dry, and intact.   Minimal erythema of the surrounding skin.   Post operative swelling of the right hip and thigh  Bilateral calves are soft, seemingly non-tender.  Right lower extremity is NVI.  Patient spontaneously DF and PF the ankles and flexes and extends the toes  DP pulse palpable.  Sensation grossly intact bilateral lower extremities.    Labs/Imaging:  Recent Labs   Lab Test 01/05/24  0959 01/04/24  0719 01/03/24  0720 01/02/24  1141   WBC  --  12.1* 9.2 15.0*   HGB 8.3* 8.4* 9.8* 10.8*   PLT  --  223 263 289     A/P    1. S/p right intertrochanteric femur fracture ORIF using IM nail  -Continue ASA 81 mg BID for DVT prophylaxis.   -Mobilize with PT/OT.  -WBAT RLE with walker.    -Continue current pain regimen.  -Dressings: Keep intact. Okay for RN to change if >60% saturated or peeling/falling off.   -Follow-up: 2 weeks post-op with Dr. Neo Pearson    2. Disposition  -Anticipate d/c toTCU when medically cleared and progressing in PT.

## 2024-01-05 NOTE — PLAN OF CARE
Trauma/Ortho/Medical (Choose one)  Ortho.    Diagnosis: ORIF of R femur.  POD#: 1  Mental Status: A&OX! Disoriented to self, time, place and situation.  Activity/dangle Assist of 2 with lift.  Diet: Regular.  Pain: no pain noticed.  Flores/Voiding: incontinent to bed.  Tele/Restraints/Iso: None.  02/LDA: Room air PIV saline locked.  D/C Date: pending.  Other Info: Pt is alert but non barbel, dressing clean dry and intact.

## 2024-01-05 NOTE — PROGRESS NOTES
[facility]    Medicine Progress Note - Hospitalist Service    Date of Admission:  1/2/2024    Assessment & Plan   Mariah Winslow is a 78 year old female with past medical history significant for dementia admitted on 1/2/2024 with a right hip fracture following a fall at her memory care facility.    Acute intertrochanteric fracture of the right femur   Patient was brought into the ER from her memory care facility with report that she was complaining of hip pain and was unable to bear weight that morning following an unwitnessed fall earlier that morning. Additional history was limited due to her dementia. Unclear mechanism.   X-ray of the pelvis and right hip showed an acute displaced and angulated right proximal femoral intratrochanteric fracture with varus alignment. CT of the pelvis showed an acute comminuted intertrochanteric fracture of the right femur with varus angulation.  Admitted to inpatient.  Orthopedic surgery consulted, appreciate their assistance.  S/p right femur open reduction intramedullary nail on 1/3/24 by Dr. Pearson.  Post-op management including therapies, pain control, DVT prophylaxis, etc. as directed by Orthopedic Surgery.  PT/OT consulted.  Care transitions consulted for discharge planning.  Awaiting TCU placement.    UTI, ruled out  WBC elevated to 15.0 in the ER.  UA was ordered in the ER, but not collected until 1/3/24, concerning for infection.  Unable to assess for symptoms due to her dementia.  WBC improved to 9.2 on 1/3/24 without intervention, but then back up to 12.1 on 1/4/24.  Fever up to 101.5 on 1/3/24, subsequently afebrile.  Urine culture grew ,000 cfu/ml of mixed urogenital koko.  Will discontinue antibiotics based on culture results. She received 3 days of IV rocephin prior to antibiotics being discontinued.    Chronic mild anemia   Hemoglobin 10.8 in the ER. Recent baseline was around 10.  Hemoglobin 9.8 on 1/3/24 and then 8.4 on 1/4/24  "and 8.3 on 1/5/24.  No obvious bleeding noted. EBL 25 mL during surgery.  Monitor intermittently.    Right adnexal cystic mass   CT abdomen/pelvis on 1/2/24 incidentally demonstrated an indeterminate multilocular cystic mass in the right adnexa measuring 8 cm in greatest dimension.  Discussed with patient's sister, Letty, by phone on 1/5/24. Plan to give Nisa time to recover from her fracture, then consider outpatient imaging and/or Gynecology evaluation depending on how she is doing at that point and depending on overall goals of care in setting of significant dementia.    History of DVT   No longer on anticoagulation.  Post-op DVT prophylaxis as directed by Orthopedic Surgery, currently on aspirin 81 mg BID.    Dementia  Patient resides in memory care. Appears to be at baseline mental status per discussion with her sister - normally either says yes to everything or doesn't respond to questions.  Delirium precautions.  Plan of care discussed with her sister, Letty, by phone again on 1/5/24.          Diet: Advance Diet as Tolerated: Regular Diet Adult    DVT Prophylaxis: Pneumatic Compression Devices and Aspirin  Flores Catheter: Not present  Lines: None     Cardiac Monitoring: None  Code Status: Full Code      Clinically Significant Risk Factors                      # Dementia: noted on problem list    # Cachexia: Estimated body mass index is 17.71 kg/m  as calculated from the following:    Height as of this encounter: 1.6 m (5' 3\").    Weight as of this encounter: 45.4 kg (100 lb)., PRESENT ON ADMISSION            Disposition Plan      Expected Discharge Date: 01/06/2024    Discharge Delays: Placement - TCU                Marco Antonio Giron MD  Hospitalist Service  [facility]  Securely message with [employer] (more info)  Text page via [facility] Paging/Directory   ______________________________________________________________________    Interval History   Mariah PRICILLA Goldy was seen today. She " seems to feel OK. Sitting up in a chair. No apparent distress. Any a few yes/no questions, but isn't consistent in her answers. Unable to get any significant history from her.    Physical Exam   Vital Signs: Temp: 98.8  F (37.1  C) Temp src: Axillary BP: 100/50 Pulse: 81   Resp: 16 SpO2: 96 % O2 Device: None (Room air)    Weight: 100 lbs 0 oz    Constitutional: awake, alert, cooperative, no apparent distress, sitting up in a chair  Respiratory: no increased work of breathing, clear to auscultation bilaterally, no crackles or wheezing  Cardiovascular: regular rate and rhythm, normal S1 and S2, no murmur noted  GI: normal bowel sounds, soft, non-distended, non-tender  Skin: warm, dry  Musculoskeletal: no lower extremity pitting edema present  Neurologic: awake, alert, few yes/no answers to questions but not consistent, doesn't answer most questions    Medical Decision Making       40 MINUTES SPENT BY ME on the date of service doing chart review, history, exam, documentation & further activities per the note.      Data     I have personally reviewed the following data over the past 24 hrs:    N/A  \   8.3 (L)   / N/A     N/A N/A N/A /  N/A   N/A N/A N/A \

## 2024-01-05 NOTE — PLAN OF CARE
Goal Outcome Evaluation:    Date/time: 2300-0730     Trauma/Ortho/Medical (Choose one)  Ortho.  Diagnosis: ORIF of R hip  POD#: 2  Mental Status: Alert to self, non verbal   Activity/dangle ASTx 2 w/ lift. T&R   Diet: Reg  Pain: schedule tylenol   Flores/Voiding: Inct B/B   Tele/Restraints/Iso: None.  02/LDA: Room air PIV SL   D/C Date: pending   Other Info: Dressing C/D/I , VSS, plan of care ongoing.

## 2024-01-05 NOTE — CONSULTS
Care Management Initial Consult    General Information  Assessment completed with: -chart review, Family, sister Shawna  Type of CM/SW Visit: CM Role Introduction    Primary Care Provider verified and updated as needed: Yes   Readmission within the last 30 days:        Reason for Consult: discharge planning  Advance Care Planning:            Communication Assessment  Patient's communication style: spoken language (English or Bilingual)    Hearing Difficulty or Deaf: no   Wear Glasses or Blind: no    Cognitive  Cognitive/Neuro/Behavioral: .WDL except  Level of Consciousness: alert, confused  Arousal Level: opens eyes spontaneously  Orientation: disoriented to, place, time, situation  Mood/Behavior: calm, cooperative  Best Language:  (mostly non-verbal)  Speech: other (see comments) (non verbal)    Living Environment:   People in home: alone     Current living Arrangements: extended care facility  [facility]   Able to return to prior arrangements: yes       Family/Social Support:  Care provided by: homecare agency, other (see comments)  Provides care for: no one, unable/limited ability to care for self  Marital Status:   Sibling(s)          Description of Support System: Supportive, Involved         Current Resources:   Patient receiving home care services:       Community Resources:    Equipment currently used at home: none  Supplies currently used at home:      Employment/Financial:  Employment Status: retired        Financial Concerns:             Does the patient's insurance plan have a 3 day qualifying hospital stay waiver?  No    Lifestyle & Psychosocial Needs:  Social Determinants of Health     Food Insecurity: Not on file   Depression: At risk (2/26/2019)    PHQ-2     PHQ-2 Score: 3   Housing Stability: Not on file   Tobacco Use: High Risk (1/3/2024)    Patient History     Smoking Tobacco Use: Every Day     Smokeless Tobacco Use: Never     Passive Exposure: Not on file    Financial Resource Strain: Not on file   Alcohol Use: Not on file   Transportation Needs: Not on file   Physical Activity: Not on file   Interpersonal Safety: Not on file   Stress: Not on file   Social Connections: Not on file       Functional Status:  Prior to admission patient needed assistance:              Mental Health Status:          Chemical Dependency Status:                Values/Beliefs:  Spiritual, Cultural Beliefs, Mormonism Practices, Values that affect care:                 Additional Information:  Consult for discharge planning. Patient admitted for R hip fracture  on 1/2/24 with tentative discharge date of 1/6/24. Writer reviewed chart and TCU recommendations at discharge.     Writer met with sister Shawna to complete consult over the phone as she is not in patient room and introduced self and role. Writer noted that per PT assessment, [facility] cannot accept patient back while an Ax2 and lift and Shawna is in agreement for TCU at discharge and would like referrals sent to [facility] as Shawna has been there in the past.  Additional referrals ok to be sent to [facility]. Writer confirmed patient's primary doctor is [facility].    Transportation not discussed but likely needed. SW to keep sister updated.     1430: [facility] offered a shared room on memory care unit as TCU patient and bed is ready anytime. Call to patient's sister to confirm and she is very happy to hear this. Call to [facility] and tentative stretcher set up for 1/6/24 between 1052-5973.     LORETO Ren

## 2024-01-06 VITALS
HEART RATE: 77 BPM | WEIGHT: 100 LBS | RESPIRATION RATE: 18 BRPM | DIASTOLIC BLOOD PRESSURE: 63 MMHG | HEIGHT: 63 IN | OXYGEN SATURATION: 97 % | BODY MASS INDEX: 17.72 KG/M2 | SYSTOLIC BLOOD PRESSURE: 93 MMHG | TEMPERATURE: 97.9 F

## 2024-01-06 LAB — GLUCOSE BLDC GLUCOMTR-MCNC: 94 MG/DL (ref 70–99)

## 2024-01-06 PROCEDURE — 99239 HOSP IP/OBS DSCHRG MGMT >30: CPT | Performed by: STUDENT IN AN ORGANIZED HEALTH CARE EDUCATION/TRAINING PROGRAM

## 2024-01-06 PROCEDURE — 250N000013 HC RX MED GY IP 250 OP 250 PS 637

## 2024-01-06 RX ADMIN — POLYETHYLENE GLYCOL 3350 17 G: 17 POWDER, FOR SOLUTION ORAL at 10:34

## 2024-01-06 RX ADMIN — ASPIRIN 81 MG: 81 TABLET, COATED ORAL at 10:33

## 2024-01-06 RX ADMIN — ACETAMINOPHEN 975 MG: 325 TABLET, FILM COATED ORAL at 10:33

## 2024-01-06 RX ADMIN — DOCUSATE SODIUM 50 MG AND SENNOSIDES 8.6 MG 1 TABLET: 8.6; 5 TABLET, FILM COATED ORAL at 10:33

## 2024-01-06 RX ADMIN — FAMOTIDINE 20 MG: 20 TABLET ORAL at 10:33

## 2024-01-06 ASSESSMENT — ACTIVITIES OF DAILY LIVING (ADL)
ADLS_ACUITY_SCORE: 60

## 2024-01-06 NOTE — PLAN OF CARE
Goal Outcome Evaluation:    Date/Time: 1/5/23 (1026-1366)    Diagnosis: ORIF of R hip  POD#: 3  Mental Status: non-verbal, but response to name  Activity/dangle: A2 & lift  Diet: Reg  Pain: Tylenol  Flores/Voiding: incontinent.   Tele/Restraints/Iso: NA  02/LDA: ANTONIA/RADHA AMBROSE.  D/C Date: TCU today at 5219-7622  Other Info: VSS. Dressing CDI.

## 2024-01-06 NOTE — DISCHARGE SUMMARY
[facility]  Hospitalist Discharge Summary     Admit Date:  1/2/2024  Discharge Date:     1/6/2024  Discharging Provider: Mariella Mathews MD    PRIMARY CARE PROVIDER:    Ant Feldman Physician     DISCHARGE DIAGNOSES:   Acute intertrochanteric fracture of the right femur S/p right femur open reduction intramedullary nail on 1/3/24 by Dr. Pearson.  UTI, ruled out  Chronic mild anemia   Right adnexal cystic mass   History of DVT   Dementia    BRIEF HISTORY OF PRESENT ILLNESS:    Mariah Winslow is a 78 year old female with past medical history significant for dementia admitted on 1/2/2024 with a right hip fracture following a fall at her memory care facility. Patient underwent right femur open reduction intramedullary nail on 1/3/24 by Dr. Pearson.     HOSPITAL COURSE:     Acute intertrochanteric fracture of the right femur S/p right femur open reduction intramedullary nail on 1/3/24 by Dr. Pearson.  Patient was brought into the ER from her memory care facility with report that she was complaining of hip pain and was unable to bear weight that morning following an unwitnessed fall earlier that morning. Additional history was limited due to her dementia. Unclear mechanism. X-ray of the pelvis and right hip showed an acute displaced and angulated right proximal femoral intratrochanteric fracture with varus alignment. CT of the pelvis showed an acute comminuted intertrochanteric fracture of the right femur with varus angulation.  Orthopedic surgery consulted, appreciate their assistance.  Post-op management including therapies, pain control, DVT prophylaxis, etc. as directed by Orthopedic Surgery.  PT/OT consulted.  Recommend TCUU  Care transitions consulted for discharge planning.  Has TCU placement today        UTI, ruled out  WBC elevated to 15.0 in the ER.  UA was ordered in the ER, but not collected until 1/3/24, concerning for infection.  Unable to assess for symptoms due to  "her dementia.WBC improved to 9.2 on 1/3/24 without intervention, but then back up to 12.1 on 1/4/24.Fever up to 101.5 on 1/3/24, subsequently afebrile.  Urine culture grew ,000 cfu/ml of mixed urogenital koko.  Antibiotics discontinued based on culture results. She received 3 days of IV rocephin prior to antibiotics being discontinued.     Chronic mild anemia   Hemoglobin 10.8 in the ER. Recent baseline was around 10.  Hemoglobin 9.8 on 1/3/24 and then 8.4 on 1/4/24 and 8.3 on 1/5/24.  No obvious bleeding noted. EBL 25 mL during surgery.  Monitor intermittently.     Right adnexal cystic mass   CT abdomen/pelvis on 1/2/24 incidentally demonstrated an indeterminate multilocular cystic mass in the right adnexa measuring 8 cm in greatest dimension.  Previous hospitalist discussed with patient's sister, Letty, by phone on 1/5/24. Plan to give Nisa time to recover from her fracture, then consider outpatient imaging and/or Gynecology evaluation depending on how she is doing at that point and depending on overall goals of care in setting of significant dementia.     History of DVT   No longer on anticoagulation.  Post-op DVT prophylaxis as directed by Orthopedic Surgery, currently on aspirin 81 mg BID.     Dementia  Patient resides in memory care. Appears to be at baseline mental status per discussion with her sister - normally either says yes to everything or doesn't respond to questions.  Delirium precautions.  Plan of care discussed with her sister, Letty, by phone again on 1/5/24 byp [facility] hospitalist.       Clinically Significant Risk Factors                      # Dementia: noted on problem list    # Cachexia: Estimated body mass index is 17.71 kg/m  as calculated from the following:    Height as of this encounter: 1.6 m (5' 3\").    Weight as of this encounter: 45.4 kg (100 lb)., PRESENT ON ADMISSION                 TOTAL DISCHARGE TIME:  IMariella MD, personally saw the patient today and " spent greater than 30 minutes discharging this patient.    Mariella Mathews MD  [facility]  Hospitalist Service   Securely message with the [employer] Web Console (learn more here)  Text page via [employer] Paging/Directory        Significant Results and Procedures   S/p right femur open reduction intramedullary nail on 1/3/24 by Dr. Pearson.    Pending Results   These results will be followed up by n/a  Unresulted Labs Ordered in the Past 30 Days of this Admission       No orders found from 12/3/2023 to 1/3/2024.            Code Status   Full Code       Primary Care Physician   [facility]    Physical Exam   Temp: 97.9  F (36.6  C) Temp src: Axillary BP: 93/63 Pulse: 77   Resp: 18 SpO2: 97 % O2 Device: None (Room air)    Vitals:    01/03/24 1216   Weight: 45.4 kg (100 lb)     Vital Signs with Ranges  Temp:  [97.9  F (36.6  C)-99.5  F (37.5  C)] 97.9  F (36.6  C)  Pulse:  [77-85] 77  Resp:  [16-18] 18  BP: ()/(50-63) 93/63  SpO2:  [94 %-97 %] 97 %  No intake/output data recorded.    Constitutional: Awake, alert, cooperative, no apparent distress.    ENT: Normocephalic, without obvious abnormality, atraumatic, oral pharynx with moist mucus membranes  Pulmonary: Clear breath sounds bilaterally   Cardiovascular: Regular rate and rhythm, normal S1 and S2  GI: Normal bowel sounds, soft, non-distended, non-tender.    Skin/Integumen: Bandages on right thigh  Neuro: Patient seen moving all 4 extremities without difficulty.    Psych:  Alert, answers some yes or no questions but other times does not answer the question  Extremities: No lower extremity edema noted, and calves are non-tender to palpation bilaterally. Some tenderness to palpation to surgical sites    Discharge Disposition   Discharged to TCU.   Condition at discharge: Stable    Consultations This Hospital Stay   PHARMACY IP CONSULT  ORTHOPEDIC SURGERY IP CONSULT  CARE MANAGEMENT / SOCIAL WORK IP CONSULT  PHYSICAL  THERAPY ADULT IP CONSULT  OCCUPATIONAL THERAPY ADULT IP CONSULT  CARE MANAGEMENT / SOCIAL WORK IP CONSULT  PHYSICAL THERAPY ADULT IP CONSULT  OCCUPATIONAL THERAPY ADULT IP CONSULT      Discharge Orders      General info for SNF    Length of Stay Estimate: Short Term Care: Estimated # of Days <30  Condition at Discharge: Stable  Level of care:skilled   Rehabilitation Potential: Fair  Admission H&P remains valid and up-to-date: Yes  Recent Chemotherapy: N/A  Use Nursing Home Standing Orders: Yes     Mantoux instructions    Give two-step Mantoux (PPD) Per Facility Policy Yes     Follow Up and recommended labs and tests    Please call as soon as possible to make an appointment to be seen in Dr. Neo Pearson's clinic at 2 weeks postop for a recheck of your surgical site, possible repeat x-rays, and wound care. Please contact Dr. Pearson's team at [phone] to schedule an appointment.       Dr. Pearson sees patients at 2 clinic locations:  [facility]  [address] [place] [address] [id number] [place] [address] [facility]   [address], [address], [place] [address]      Please call the on-call phone number [phone] during evenings, nights and weekends for any urgent needs.     Reason for your hospital stay    S/p right intertrochanteric femur fracture ORIF using IM nail (DOS: 1/3/24) with Dr. Clarence Pearson     Wound care    Site: Right hip  Instructions:  Please leave dressing intact for 2 weeks postoperatively. Okay to change if saturated >60% or peeling. If an Aquacel or gauze/tegaderm is in place over your surgical sites, it is okay to shower without covering the dressings. If you have a soft dressing, you must securely cover your dressing while showering or sponge bathe. Absolutely no soaking or submersion. Please contact your orthopedic surgical team if persistent drainage or redness develops around the surgical site.     Additional Discharge Instructions    Pain after  surgery is normal and expected.  You will have some amount of pain and swelling for several weeks after surgery.  These symptoms will improve with time.  There are several things you can do to help reduce your pain including: rest, cold compresses, elevation, and using pain medications as needed. Contact your Surgeon Team if you have pain that persists or worsens after surgery despite rest, ice, elevation, and taking your medication(s) as prescribed. Contact your Surgeon Team if you have new numbness, tingling, or weakness in your operative extremity.    Swelling and/or bruising of the surgical extremity is common and may persist for several months after surgery. In addition to frequent icing and elevation, gentle compressive support with an ACE wrap or tubigrip may help with swelling. Apply compression regularly, removing at least twice daily to perform skin checks. Contact your Surgeon Team if your swelling increases and is NOT associated with an increase in your activity level, or if your swelling increases and is associated with redness and pain.    Ice can be used to control swelling and discomfort after surgery. Place a thin towel over your operative site and apply the ice pack overtop. Leave ice pack in place for 20 minutes, then remove for 20 minutes. Repeat this 20 minutes on/20 minutes off routine as often as tolerated.    Please contact your surgical team with any concerns for infection including increasing redness around your surgical site, pus-like drainage, fever, chills, or flu-like symptoms.     Activity - Up with assistive device     Activity - Up with nursing assistance     Weight bearing status    WBAT RLE with walker x 6-8 weeks     Follow Up and recommended labs and tests    Follow up with halfway physician.  The following labs/tests are recommended: CBC.     Full Code     Physical Therapy Adult Consult    Evaluate and treat as clinically indicated.    Reason: S/p right intertrochanteric  femur fracture ORIF using IM nail (DOS: 1/3/24) with Dr. Clarence Pearson     Occupational Therapy Adult Consult    Evaluate and treat as clinically indicated.    Reason: S/p right intertrochanteric femur fracture ORIF using IM nail (DOS: 1/3/24) with Dr. Clarence Pearson     Fall precautions     Fall precautions     Cane DME    DME Documentation: Describe the reason for need to support medical necessity: Impaired gait status post hip surgery. I, the undersigned, certify that the above prescribed supplies are medically necessary for this patient and is both reasonable and necessary in reference to accepted standards of medical practice in the treatment of this patient's condition and is not prescribed as a convenience.     Walker DME    : DME Documentation: Describe the reason for need to support medical necessity: Impaired gait status post hip surgery. I, the undersigned, certify that the above prescribed supplies are medically necessary for this patient and is both reasonable and necessary in reference to accepted standards of medical practice in the treatment of this patient's condition and is not prescribed as a convenience.     Diet    Follow this diet upon discharge: Regular Diet Adult     Discharge Medications   Current Discharge Medication List        START taking these medications    Details   aspirin 81 MG EC tablet Take 1 tablet (81 mg) by mouth 2 times daily for 42 days    Associated Diagnoses: Closed displaced intertrochanteric fracture of right femur, initial encounter (H)      hydrOXYzine HCl (ATARAX) 10 MG tablet Take 1 tablet (10 mg) by mouth 3 times daily as needed for other (pain adjunct)    Associated Diagnoses: Closed displaced intertrochanteric fracture of right femur, initial encounter (H)      oxyCODONE (ROXICODONE) 5 MG tablet Take 0.5-1 tablets (2.5-5 mg) by mouth every 4 hours as needed for moderate to severe pain (Take 2.5 mg (1/2 tab) for pain 4-6/10, take 5 mg (1 tab) for pain 7-10/10)  Qty: 20  tablet, Refills: 0    Associated Diagnoses: Closed displaced intertrochanteric fracture of right femur, initial encounter (H)      polyethylene glycol (MIRALAX) 17 GM/Dose powder Take 17 g by mouth daily    Associated Diagnoses: Closed displaced intertrochanteric fracture of right femur, initial encounter (H)           CONTINUE these medications which have CHANGED    Details   acetaminophen (TYLENOL) 325 MG tablet Take 3 tablets (975 mg) by mouth every 8 hours as needed for mild pain    Associated Diagnoses: Closed displaced intertrochanteric fracture of right femur, initial encounter (H)           CONTINUE these medications which have NOT CHANGED    Details   ammonium lactate (LAC-HYDRIN) 12 % external lotion Apply topically daily      loperamide (IMODIUM) 2 MG capsule Take 4 mg by mouth daily as needed for diarrhea      PARoxetine (PAXIL) 20 MG tablet Take 20 mg by mouth At Bedtime      senna-docusate (SENOKOT-S/PERICOLACE) 8.6-50 MG tablet Take 1 tablet by mouth daily as needed for constipation      vitamin D3 (CHOLECALCIFEROL) 50 mcg (2000 units) tablet Take 1 tablet by mouth daily           Allergies   Allergies   Allergen Reactions    Penicillins      Data   Most Recent 3 CBC's:  Recent Labs   Lab Test 01/05/24  0959 01/04/24  0719 01/03/24  0720 01/02/24  1141   WBC  --  12.1* 9.2 15.0*   HGB 8.3* 8.4* 9.8* 10.8*   MCV  --  97 96 94   PLT  --  223 263 289      Most Recent 3 BMP's:  Recent Labs   Lab Test 01/06/24  0906 01/05/24  1904 01/04/24  0719 01/03/24  0720 01/02/24  1915 01/02/24  1141   NA  --   --  140 143  --  138   POTASSIUM  --   --  4.1 3.9  --  4.9   CHLORIDE  --   --  106 106  --  104   CO2  --   --  27 26  --  25   BUN  --   --  16.7 16.7  --  18.1   CR  --   --  0.73 0.78  --  0.68   ANIONGAP  --   --  7 11  --  9   DAYA  --   --  8.6* 8.9  --  9.3   GLC 94 114* 115* 127*   < > 142*    < > = values in this interval not displayed.     Most Recent 2 LFT's:  Recent Labs   Lab Test 03/09/18  1500    AST 25   ALT 18   ALKPHOS 62   BILITOTAL 0.2     Most Recent INR's and Anticoagulation Dosing History:  Anticoagulation Dose History           No data to display              Most Recent 3 Troponin's:No lab results found.  Most Recent Cholesterol Panel:No lab results found.  Most Recent 6 Bacteria Isolates From Any Culture (See EPIC Reports for Culture Details):  Recent Labs   Lab Test 03/09/18  1500   CULT No growth     Most Recent TSH, T4 and A1c Labs:  Recent Labs   Lab Test 03/09/18  1500   TSH 1.27

## 2024-01-06 NOTE — PLAN OF CARE
Goal Outcome Evaluation:    01/05/24  7a-7p     Trauma/Ortho/Medical (Choose one)  Ortho.  Diagnosis: ORIF of R Hip  POD#: 2  Mental Status: Alert to self, non-verbal   Activity/dangle:  A2 w/ lift, up in chair  Diet: Regular  Pain: scheduled Tylenol   Flores/Voiding: incontinent   Tele/Restraints/Iso: n/a  02/LDA: room air, PIV SL   D/C Date: TCU tomorrow [id number], stretcher ride  Other Info:    Will review with covering partner for Dr. Andres.

## 2024-01-06 NOTE — PROGRESS NOTES
Orthopedic Surgery  Mariah Winslow  01/06/2024     Admit Date:  1/2/2024    POD: 3 Days Post-Op   Procedure(s):  OPEN REDUCTION INTERNAL FIXATION, FRACTURE, FEMUR, USING INTRAMEDULLARY JOSE LUIS, RIGHT    Advanced dementia. Alert to self  Patient resting comfortably in bed.   Patient appears comfortable.   Tolerating oral intake.    No acute events overnight.    Temp:  [97.9  F (36.6  C)-99.5  F (37.5  C)] 97.9  F (36.6  C)  Pulse:  [77-85] 77  Resp:  [16-18] 18  BP: ()/(50-63) 93/63  SpO2:  [94 %-97 %] 97 %    Alert to self.   Right hip dressings are clean, dry, and intact.   Minimal erythema of the surrounding skin.   Bilateral calves are soft, seemingly non-tender.  Right lower extremity is NVI.  Patient spontaneously DF and PF the ankles and flexes and extends the toes  DP pulse palpable.      Labs/Imaging:  Recent Labs   Lab Test 01/05/24  0959 01/04/24  0719 01/03/24  0720 01/02/24  1141   WBC  --  12.1* 9.2 15.0*   HGB 8.3* 8.4* 9.8* 10.8*   PLT  --  223 263 289     A/P    1. S/p right intertrochanteric femur fracture ORIF using IM nail  -Continue ASA 81 mg BID for DVT prophylaxis.   -Mobilize with PT/OT.  -WBAT RLE with walker.    -Continue current pain regimen.  -Dressings: Keep intact. Okay for RN to change if >60% saturated or peeling/falling off.   -Follow-up: 2 weeks post-op with Dr. Neo Pearson    2. Disposition  -Anticipate d/c toTCU when medically cleared and progressing in PT. Ortho stable. Contact ortho trauma team if any new questions or concerns arise.

## 2024-01-06 NOTE — PROGRESS NOTES
Care Management Discharge Note    Discharge Date: 01/06/2024       Discharge Disposition: Transitional Care    Discharge Services: Transportation Services    Discharge DME: None    Discharge Transportation:      Private pay costs discussed: transportation costs    Does the patient's insurance plan have a 3 day qualifying hospital stay waiver?  Yes     Which insurance plan 3 day waiver is available? Medicare    Will the waiver be used for post-acute placement? Yes    PAS Confirmation Code:  [id number]  Patient/family educated on Medicare website which has current facility and service quality ratings: yes    Education Provided on the Discharge Plan:    Persons Notified of Discharge Plans: Patient, sister, TCU, MD, Nursing    Patient/Family in Agreement with the Plan: yes    Handoff Referral Completed: No    Additional Information:  Writer confirmed with Dr. Mathews that patient is medically ready for discharge today to [facility]. Orders received and faxed via [employer] to [facility]. Writer called and spoke with Marie in admissions who confirmed transport time and discharge for today. Writer called sisterShawna and updated her on discharge who is very pleased with the plan. Writer updated charge RN regarding discharge and she will complete the packet.       PAS completed. PCS form completed and faxed to [employer]. PAS and PCS tubed up to ortho station to be placed on chart.     PAS-RR    D: Per DHS regulation, SW completed and submitted PAS-RR to [place] Board on Aging Direct Connect via the Senior LinkAge Line.  PAS-RR confirmation # is : [id number]    I: SW spoke with patient's sister and they are aware a PAS-RR has been submitted.  PAUL reviewed with patients sister that they may be contacted for a follow up appointment within 10 days of hospital discharge if their SNF stay is < 30 days.  Contact information for Senior LinkAge Line was also provided.    A: patients sister verbalized understanding.    P: Further questions may be  directed to Senior LinkAge Line at #[phone], option #4 for PAS-RR staff.     Addendum 1000: Script faxed to [email or web address] at [phone]    Addendum 1230: Asked by charge RN that transport has not arrived. Writer called transport and spoke with [facility]. Patient's rig is near the hospital about a few minutes away. Writer updated charge RN.     Jessica Cannon, MICHELLE, [facility]   Social Work   [facility]

## 2024-01-08 ENCOUNTER — DOCUMENTATION ONLY (OUTPATIENT)
Dept: GERIATRICS | Facility: CLINIC | Age: 79
End: 2024-01-08
Payer: MEDICARE

## 2024-01-08 ENCOUNTER — NURSING HOME VISIT (OUTPATIENT)
Dept: GERIATRICS | Facility: CLINIC | Age: 79
End: 2024-01-08
Payer: MEDICARE

## 2024-01-08 VITALS
WEIGHT: 100 LBS | RESPIRATION RATE: 18 BRPM | SYSTOLIC BLOOD PRESSURE: 111 MMHG | HEIGHT: 63 IN | HEART RATE: 103 BPM | OXYGEN SATURATION: 98 % | TEMPERATURE: 97 F | DIASTOLIC BLOOD PRESSURE: 62 MMHG | BODY MASS INDEX: 17.72 KG/M2

## 2024-01-08 DIAGNOSIS — J44.9 CHRONIC OBSTRUCTIVE PULMONARY DISEASE, UNSPECIFIED COPD TYPE (H): ICD-10-CM

## 2024-01-08 DIAGNOSIS — D62 ANEMIA DUE TO BLOOD LOSS, ACUTE: ICD-10-CM

## 2024-01-08 DIAGNOSIS — S72.001D TRAUMATIC FRACTURE OF RIGHT HIP WITH ROUTINE HEALING: Primary | ICD-10-CM

## 2024-01-08 DIAGNOSIS — R53.81 PHYSICAL DECONDITIONING: ICD-10-CM

## 2024-01-08 DIAGNOSIS — K59.03 DRUG-INDUCED CONSTIPATION: ICD-10-CM

## 2024-01-08 DIAGNOSIS — E44.0 MODERATE PROTEIN-CALORIE MALNUTRITION (H): ICD-10-CM

## 2024-01-08 PROCEDURE — 99310 SBSQ NF CARE HIGH MDM 45: CPT | Performed by: NURSE PRACTITIONER

## 2024-01-08 RX ORDER — ACETAMINOPHEN 500 MG
1000 TABLET ORAL 3 TIMES DAILY
Start: 2024-01-08

## 2024-01-08 NOTE — PROGRESS NOTES
[facility] GERIATRICS    PRIMARY CARE PROVIDER AND CLINIC:  Tonya Lynn Haase, APRN CNP, [address] [facility] [address]  Chief Complaint   Patient presents with    Hospital F/U      [place] Medical Record Number:  [id number]  Place of Service where encounter took place:  [facility] () [[id number]]    Mariah Winslow  is a 78 year old  (1945), admitted to the above facility from  [facility]. Hospital stay 1/2/24 through 1/6/24..   HPI:    PMH of dementia, anemia, COPD who presented after an unwitnessed fall.   Right intertrochanteric Fx of femur: s/p IM nailing 1/3/24 Dr. Pearson  Anemia: Hgb 10.8--> 8.3 no transfusion  Right adnexal cystic mass: incidentally found CT of abdomen/pelvis 1/2/24. Will f/u as OP   Dementia: patient resides at a memory care, baseline mentation continue delirium precautions.   On exam today: patient is resting in bed, alert, non verbal, does not answer questions with ROS, appears comfortable., no concerns noted in nurses notes.   CODE STATUS/ADVANCE DIRECTIVES DISCUSSION:  Full Code  CPR/Full code   ALLERGIES:   Allergies   Allergen Reactions    Penicillins       PAST MEDICAL HISTORY:   Past Medical History:   Diagnosis Date    Dementia (H)     DVT (deep venous thrombosis) (H)     Major depressive disorder, recurrent episode, moderate (H) 04/29/2014      PAST SURGICAL HISTORY:   has a past surgical history that includes Oophorectomy (Left, 1982); appendectomy (1982); and Open reduction internal fixation rodding intramedullary femur (Right, 1/3/2024).  FAMILY HISTORY: family history includes Diabetes in her maternal aunt; Heart Disease (age of onset: 62) in her sister; Hypertension in her sister; Myocardial Infarction in her maternal uncle and paternal uncle.  SOCIAL HISTORY:   reports that she has been smoking cigarettes. She has a 52.5 pack-year smoking history. She has never used smokeless tobacco. She reports current alcohol  "use. She reports that she does not use drugs.  Patient's living condition: lives in a skilled nursing facility    Post Discharge Medication Reconciliation Status:   MED REC REQUIRED  Post Medication Reconciliation Status: discharge medications reconciled and changed, per note/orders       Current Outpatient Medications   Medication Sig    acetaminophen (TYLENOL) 325 MG tablet Take 3 tablets (975 mg) by mouth every 8 hours as needed for mild pain    ammonium lactate (LAC-HYDRIN) 12 % external lotion Apply topically daily    aspirin 81 MG EC tablet Take 1 tablet (81 mg) by mouth 2 times daily for 42 days    hydrOXYzine HCl (ATARAX) 10 MG tablet Take 1 tablet (10 mg) by mouth 3 times daily as needed for other (pain adjunct)    loperamide (IMODIUM) 2 MG capsule Take 4 mg by mouth daily as needed for diarrhea    oxyCODONE (ROXICODONE) 5 MG tablet Take 0.5-1 tablets (2.5-5 mg) by mouth every 4 hours as needed for moderate to severe pain (Take 2.5 mg (1/2 tab) for pain 4-6/10, take 5 mg (1 tab) for pain 7-10/10)    PARoxetine (PAXIL) 20 MG tablet Take 20 mg by mouth At Bedtime    polyethylene glycol (MIRALAX) 17 GM/Dose powder Take 17 g by mouth daily    senna-docusate (SENOKOT-S/PERICOLACE) 8.6-50 MG tablet Take 1 tablet by mouth daily as needed for constipation    vitamin D3 (CHOLECALCIFEROL) 50 mcg (2000 units) tablet Take 1 tablet by mouth daily     No current facility-administered medications for this visit.       ROS:  10 point ROS of systems including Constitutional, Eyes, Respiratory, Cardiovascular, Gastroenterology, Genitourinary, Integumentary, Musculoskeletal, Psychiatric were all negative except for pertinent positives noted in my HPI.    Vitals:  /62   Pulse 103   Temp 97  F (36.1  C)   Resp 18   Ht 1.6 m (5' 3\")   Wt 45.4 kg (100 lb)   LMP  (LMP Unknown)   SpO2 98%   BMI 17.71 kg/m    Exam:  GENERAL APPEARANCE:  Alert, in no distress  ENT:  Mouth and posterior oropharynx normal, moist mucous " membranes, [place]  EYES:  EOM, conjunctivae, lids, pupils and irises normal, PERRL  RESP:  respiratory effort and palpation of chest normal, lungs clear to auscultation , no respiratory distress  CV:  Palpation and auscultation of heart done , regular rate and rhythm, no murmur, rub, or gallop, no edema  ABDOMEN:  normal bowel sounds, soft, nontender, no hepatosplenomegaly or other masses  M/S:   patient resting in bed  SKIN:  Inspection of skin and subcutaneous tissue baseline, did not visualize surgical incision  NEURO:   non verbal  PSYCH:  affect and mood normal    Lab/Diagnostic data:  Recent labs in [facility] reviewed by me today.  and Most Recent 3 CBC's:  Recent Labs   Lab Test 01/05/24  0959 01/04/24  0719 01/03/24  0720 01/02/24  1141   WBC  --  12.1* 9.2 15.0*   HGB 8.3* 8.4* 9.8* 10.8*   MCV  --  97 96 94   PLT  --  223 263 289     Most Recent 3 BMP's:  Recent Labs   Lab Test 01/06/24  0906 01/05/24  1904 01/04/24  0719 01/03/24  0720 01/02/24  1915 01/02/24  1141   NA  --   --  140 143  --  138   POTASSIUM  --   --  4.1 3.9  --  4.9   CHLORIDE  --   --  106 106  --  104   CO2  --   --  27 26  --  25   BUN  --   --  16.7 16.7  --  18.1   CR  --   --  0.73 0.78  --  0.68   ANIONGAP  --   --  7 11  --  9   DAYA  --   --  8.6* 8.9  --  9.3   GLC 94 114* 115* 127*   < > 142*    < > = values in this interval not displayed.       ASSESSMENT/PLAN:    (S72.001D) Traumatic fracture of right hip with routine healing  (primary encounter diagnosis)  (R53.81) Physical deconditioning  Comment: acute/ongoing s/p IM nailing Dr. Pearson 1/2/24  Plan: PT and OT, change tylenol to 1000mg TID scheduled and qhs prn, atarax 10mg q 8 hours prn, oxycodone 2.5-5mg q 4 hours prn  ASA 81mg BID for 42 days Patient has a Hx of DVT  F/u with ortho as directed    (D62) Anemia due to blood loss, acute  Comment: acute on chronic  Hgb 8.2  Plan: CBC on Thursday,  may consider starting iron supplement    (E44.0) Moderate protein-calorie  malnutrition (H24)  Comment: acute/ongoing  Plan: dietician to follow    (K59.03) Drug-induced constipation  Comment: acute/ongoing  Plan: miralax 17gm QD, senna s 1 PO QD prn    (J44.9) Chronic obstructive pulmonary disease, unspecified COPD type (H)  Comment: ongoing  Plan: monitor SaO2 at rest and with activity      Orders:  CBC and BMP on Thursday  [id number] TID scheduled and qhs prn      Total time spent with patient visit at the [facility] nursing facility was 45 min including patient visit and review of past records. Reviewed ortho and internal medicine progress notes, lab results and medication changes.     Electronically signed by:  Tonya Lynn Haase, APRN CNP

## 2024-01-08 NOTE — LETTER
1/8/2024        RE: Mariah Winslow  [facility]  [address]  [facility] [address]        [facility] GERIATRICS    PRIMARY CARE PROVIDER AND CLINIC:  Tonya Lynn Haase, APRN [facility], [address] [facility] [address]  Chief Complaint   Patient presents with     Hospital F/U      [place] Medical Record Number:  [id number]  Place of Service where encounter took place:  [facility] ([facility]) [[id number]]    Mariah Winslow  is a 78 year old  (1945), admitted to the above facility from  [facility]. Hospital stay 1/2/24 through 1/6/24..   HPI:    PMH of dementia, anemia, COPD who presented after an unwitnessed fall.   Right intertrochanteric Fx of femur: s/p IM nailing 1/3/24 Dr. Pearson  Anemia: Hgb 10.8--> 8.3 no transfusion  Right adnexal cystic mass: incidentally found CT of abdomen/pelvis 1/2/24. Will f/u as OP   Dementia: patient resides at a [facility], baseline mentation continue delirium precautions.   On exam today: patient is resting in bed, alert, non verbal, does not answer questions with ROS, appears comfortable., no concerns noted in nurses notes.   CODE STATUS/ADVANCE DIRECTIVES DISCUSSION:  Full Code  CPR/Full code   ALLERGIES:   Allergies   Allergen Reactions     Penicillins       PAST MEDICAL HISTORY:   Past Medical History:   Diagnosis Date     Dementia (H)      DVT (deep venous thrombosis) (H)      Major depressive disorder, recurrent episode, moderate (H) 04/29/2014      PAST SURGICAL HISTORY:   has a past surgical history that includes Oophorectomy (Left, 1982); appendectomy (1982); and Open reduction internal fixation rodding intramedullary femur (Right, 1/3/2024).  FAMILY HISTORY: family history includes Diabetes in her maternal aunt; Heart Disease (age of onset: 62) in her sister; Hypertension in her sister; Myocardial Infarction in her maternal uncle and paternal uncle.  SOCIAL HISTORY:   reports that she has been smoking  "cigarettes. She has a 52.5 pack-year smoking history. She has never used smokeless tobacco. She reports current alcohol use. She reports that she does not use drugs.  Patient's living condition: lives in a skilled nursing facility    Post Discharge Medication Reconciliation Status:   MED REC REQUIRED  Post Medication Reconciliation Status: discharge medications reconciled and changed, per note/orders       Current Outpatient Medications   Medication Sig     acetaminophen (TYLENOL) 325 MG tablet Take 3 tablets (975 mg) by mouth every 8 hours as needed for mild pain     ammonium lactate (LAC-HYDRIN) 12 % external lotion Apply topically daily     aspirin 81 MG EC tablet Take 1 tablet (81 mg) by mouth 2 times daily for 42 days     hydrOXYzine HCl (ATARAX) 10 MG tablet Take 1 tablet (10 mg) by mouth 3 times daily as needed for other (pain adjunct)     loperamide (IMODIUM) 2 MG capsule Take 4 mg by mouth daily as needed for diarrhea     oxyCODONE (ROXICODONE) 5 MG tablet Take 0.5-1 tablets (2.5-5 mg) by mouth every 4 hours as needed for moderate to severe pain (Take 2.5 mg (1/2 tab) for pain 4-6/10, take 5 mg (1 tab) for pain 7-10/10)     PARoxetine (PAXIL) 20 MG tablet Take 20 mg by mouth At Bedtime     polyethylene glycol (MIRALAX) 17 GM/Dose powder Take 17 g by mouth daily     senna-docusate (SENOKOT-S/PERICOLACE) 8.6-50 MG tablet Take 1 tablet by mouth daily as needed for constipation     vitamin D3 (CHOLECALCIFEROL) 50 mcg (2000 units) tablet Take 1 tablet by mouth daily     No current facility-administered medications for this visit.       ROS:  10 point ROS of systems including Constitutional, Eyes, Respiratory, Cardiovascular, Gastroenterology, Genitourinary, Integumentary, Musculoskeletal, Psychiatric were all negative except for pertinent positives noted in my HPI.    Vitals:  /62   Pulse 103   Temp 97  F (36.1  C)   Resp 18   Ht 1.6 m (5' 3\")   Wt 45.4 kg (100 lb)   LMP  (LMP Unknown)   SpO2 98%   " BMI 17.71 kg/m    Exam:  GENERAL APPEARANCE:  Alert, in no distress  ENT:  Mouth and posterior oropharynx normal, moist mucous membranes, [place]  EYES:  EOM, conjunctivae, lids, pupils and irises normal, PERRL  RESP:  respiratory effort and palpation of chest normal, lungs clear to auscultation , no respiratory distress  CV:  Palpation and auscultation of heart done , regular rate and rhythm, no murmur, rub, or gallop, no edema  ABDOMEN:  normal bowel sounds, soft, nontender, no hepatosplenomegaly or other masses  M/S:   patient resting in bed  SKIN:  Inspection of skin and subcutaneous tissue baseline, did not visualize surgical incision  NEURO:   non verbal  PSYCH:  affect and mood normal    Lab/Diagnostic data:  Recent labs in [facility] reviewed by me today.  and Most Recent 3 CBC's:  Recent Labs   Lab Test 01/05/24  0959 01/04/24  0719 01/03/24  0720 01/02/24  1141   WBC  --  12.1* 9.2 15.0*   HGB 8.3* 8.4* 9.8* 10.8*   MCV  --  97 96 94   PLT  --  223 263 289     Most Recent 3 BMP's:  Recent Labs   Lab Test 01/06/24  0906 01/05/24  1904 01/04/24  0719 01/03/24  0720 01/02/24  1915 01/02/24  1141   NA  --   --  140 143  --  138   POTASSIUM  --   --  4.1 3.9  --  4.9   CHLORIDE  --   --  106 106  --  104   CO2  --   --  27 26  --  25   BUN  --   --  16.7 16.7  --  18.1   CR  --   --  0.73 0.78  --  0.68   ANIONGAP  --   --  7 11  --  9   DAYA  --   --  8.6* 8.9  --  9.3   GLC 94 114* 115* 127*   < > 142*    < > = values in this interval not displayed.       ASSESSMENT/PLAN:    (S72.001D) Traumatic fracture of right hip with routine healing  (primary encounter diagnosis)  (R53.81) Physical deconditioning  Comment: acute/ongoing s/p IM nailing Dr. Pearson 1/2/24  Plan: PT and OT, change tylenol to 1000mg TID scheduled and qhs prn, atarax 10mg q 8 hours prn, oxycodone 2.5-5mg q 4 hours prn  ASA 81mg BID for 42 days Patient has a Hx of DVT  F/u with ortho as directed    (D62) Anemia due to blood loss, acute  Comment: acute on  chronic  Hgb 8.2  Plan: CBC on Thursday,  may consider starting iron supplement    (E44.0) Moderate protein-calorie malnutrition (H24)  Comment: acute/ongoing  Plan: dietician to follow    (K59.03) Drug-induced constipation  Comment: acute/ongoing  Plan: miralax 17gm QD, senna s 1 PO QD prn    (J44.9) Chronic obstructive pulmonary disease, unspecified COPD type (H)  Comment: ongoing  Plan: monitor SaO2 at rest and with activity      Orders:  CBC and BMP on Thursday  [id number] TID scheduled and qhs prn      Total time spent with patient visit at the [facility] nursing [facility] was 45 min including patient visit and review of past records. Reviewed ortho and internal medicine progress notes, lab results and medication changes.     Electronically signed by:  Tonya Lynn Haase, APRN CNP                   Sincerely,        Tonya Lynn Haase, APRN CNP

## 2024-01-08 NOTE — PLAN OF CARE
Physical Therapy Discharge Summary    Reason for therapy discharge:    Discharged to transitional care facility.    Progress towards therapy goal(s). See goals on Care Plan in [facility] electronic health record for goal details.  Goals not met.  Barriers to achieving goals:   discharge from facility.    Therapy recommendation(s):    Continued therapy is recommended.  Rationale/Recommendations:  To progress independence and safety with functional mobility.

## 2024-01-09 ENCOUNTER — DOCUMENTATION ONLY (OUTPATIENT)
Dept: OTHER | Facility: CLINIC | Age: 79
End: 2024-01-09
Payer: MEDICARE

## 2024-01-11 ENCOUNTER — DOCUMENTATION ONLY (OUTPATIENT)
Dept: GERIATRICS | Facility: CLINIC | Age: 79
End: 2024-01-11

## 2024-01-11 ENCOUNTER — TELEPHONE (OUTPATIENT)
Dept: GERIATRICS | Facility: CLINIC | Age: 79
End: 2024-01-11

## 2024-01-11 ENCOUNTER — NURSING HOME VISIT (OUTPATIENT)
Dept: GERIATRICS | Facility: CLINIC | Age: 79
End: 2024-01-11
Payer: MEDICARE

## 2024-01-11 VITALS
TEMPERATURE: 97.8 F | OXYGEN SATURATION: 98 % | DIASTOLIC BLOOD PRESSURE: 57 MMHG | BODY MASS INDEX: 22.43 KG/M2 | HEART RATE: 78 BPM | RESPIRATION RATE: 16 BRPM | SYSTOLIC BLOOD PRESSURE: 136 MMHG | WEIGHT: 131.4 LBS | HEIGHT: 64 IN

## 2024-01-11 DIAGNOSIS — E44.0 MODERATE PROTEIN-CALORIE MALNUTRITION (H): ICD-10-CM

## 2024-01-11 DIAGNOSIS — R53.81 PHYSICAL DECONDITIONING: ICD-10-CM

## 2024-01-11 DIAGNOSIS — J44.9 CHRONIC OBSTRUCTIVE PULMONARY DISEASE, UNSPECIFIED COPD TYPE (H): ICD-10-CM

## 2024-01-11 DIAGNOSIS — S72.001D TRAUMATIC FRACTURE OF RIGHT HIP WITH ROUTINE HEALING: Primary | ICD-10-CM

## 2024-01-11 DIAGNOSIS — D62 ANEMIA DUE TO BLOOD LOSS, ACUTE: ICD-10-CM

## 2024-01-11 DIAGNOSIS — K59.03 DRUG-INDUCED CONSTIPATION: ICD-10-CM

## 2024-01-11 PROCEDURE — 99310 SBSQ NF CARE HIGH MDM 45: CPT | Performed by: NURSE PRACTITIONER

## 2024-01-11 NOTE — LETTER
"    1/11/2024        RE: Mariah Winslow  [facility]  [address]  [facility] [address]        [facility] GERIATRICS    Chief Complaint   Patient presents with     RECHECK     HPI:  Mariah Winslow is a 78 year old  (1945), who is being seen today for an episodic care visit at: [facility]) [[id number]]. Today's concern is:   Traumatic right hip fracture: on exam today patient is sitting up in w/c, alert, she is non verbal, appears to be comfortable, spoke with nursing staff regarding pain control, nursing states she appears to be comfortable, sleeping well at night, in therapy patient is working on standing   Anemia: labs pending  Malnutrition: weight 131lbs, patient is eating meals, able to feed self, monitored at mealtime  COPD: SaO2  98% on room air    Allergies, and PMH/PSH reviewed in [facility] today.  REVIEW OF SYSTEMS:  Unobtainable secondary to cognitive impairment.     Objective:   /57   Pulse 78   Temp 97.8  F (36.6  C)   Resp 16   Ht 1.615 m (5' 3.6\")   Wt 59.6 kg (131 lb 6.4 oz)   LMP  (LMP Unknown)   SpO2 98%   BMI 22.84 kg/m    GENERAL APPEARANCE:  Alert, in no distress, thin  ENT:  Mouth and posterior oropharynx normal, moist mucous membranes, [place]  EYES:  EOM, conjunctivae, lids, pupils and irises normal, PERRL  RESP:  respiratory effort and palpation of chest normal, lungs clear to auscultation , no respiratory distress  CV:  Palpation and auscultation of heart done , regular rate and rhythm, no murmur, rub, or gallop, no edema  ABDOMEN:  normal bowel sounds, soft, nontender, no hepatosplenomegaly or other masses  M/S:   patient sitting up in w/c  SKIN:  Inspection of skin and subcutaneous tissue baseline  NEURO:   non verbal  PSYCH:  affect and mood normal    Recent labs in [facility] reviewed by me today.  and Most Recent 3 CBC's:  Recent Labs   Lab Test 01/05/24  0959 01/04/24  0719 01/03/24  0720 01/02/24  1141   WBC  --  12.1* 9.2 15.0*   HGB 8.3* 8.4* 9.8* " 10.8*   MCV  --  97 96 94   PLT  --  223 263 289     Most Recent 3 BMP's:  Recent Labs   Lab Test 01/06/24  0906 01/05/24  1904 01/04/24  0719 01/03/24  0720 01/02/24  1915 01/02/24  1141   NA  --   --  140 143  --  138   POTASSIUM  --   --  4.1 3.9  --  4.9   CHLORIDE  --   --  106 106  --  104   CO2  --   --  27 26  --  25   BUN  --   --  16.7 16.7  --  18.1   CR  --   --  0.73 0.78  --  0.68   ANIONGAP  --   --  7 11  --  9   DAYA  --   --  8.6* 8.9  --  9.3   GLC 94 114* 115* 127*   < > 142*    < > = values in this interval not displayed.       Assessment/Plan:  (S72.001D) Traumatic fracture of right hip with routine healing  (primary encounter diagnosis)  (R53.81) Physical deconditioning  Comment: acute/ongoing s/p IM nailing Dr. Pearson 1/2/24, no change  Plan: PT and OT, change tylenol to 1000mg TID scheduled and qhs prn, atarax 10mg q 8 hours prn, oxycodone 2.5-5mg q 4 hours prn  ASA 81mg BID for 42 days Patient has a Hx of DVT  F/u with ortho as directed     (D62) Anemia due to blood loss, acute  Comment: acute on chronic  Hgb 8.2, no change  Plan: CBC pending,  may consider starting iron supplement     (E44.0) Moderate protein-calorie malnutrition (H24)  Comment: acute/ongoing, no change  Plan: dietician to follow     (K59.03) Drug-induced constipation  Comment: acute/ongoing,  no change  Plan: miralax 17gm QD, senna s 1 PO QD prn     (J44.9) Chronic obstructive pulmonary disease, unspecified COPD type (H)  Comment: ongoing, no change  Plan: monitor SaO2 at rest and with activity          MED REC REQUIRED  Post Medication Reconciliation Status: medication reconcilation previously completed during another office visit      Orders:  No new orders      Electronically signed by: Tonya Lynn Haase, APRN CNP         Sincerely,        Tonya Lynn Haase, APRN CNP

## 2024-01-11 NOTE — PROGRESS NOTES
"[facility] GERIATRICS    Chief Complaint   Patient presents with    RECHECK     HPI:  Mariah Winslow is a 78 year old  (1945), who is being seen today for an episodic care visit at: [facility] () [[id number]]. Today's concern is:   Traumatic right hip fracture: on exam today patient is sitting up in w/c, alert, she is non verbal, appears to be comfortable, spoke with nursing staff regarding pain control, nursing states she appears to be comfortable, sleeping well at night, in therapy patient is working on standing   Anemia: labs pending  Malnutrition: weight 131lbs, patient is eating meals, able to feed self, monitored at mealtime  COPD: SaO2  98% on room air    Allergies, and PMH/PSH reviewed in [facility] today.  REVIEW OF SYSTEMS:  Unobtainable secondary to cognitive impairment.     Objective:   /57   Pulse 78   Temp 97.8  F (36.6  C)   Resp 16   Ht 1.615 m (5' 3.6\")   Wt 59.6 kg (131 lb 6.4 oz)   LMP  (LMP Unknown)   SpO2 98%   BMI 22.84 kg/m    GENERAL APPEARANCE:  Alert, in no distress, thin  ENT:  Mouth and posterior oropharynx normal, moist mucous membranes, [place]  EYES:  EOM, conjunctivae, lids, pupils and irises normal, PERRL  RESP:  respiratory effort and palpation of chest normal, lungs clear to auscultation , no respiratory distress  CV:  Palpation and auscultation of heart done , regular rate and rhythm, no murmur, rub, or gallop, no edema  ABDOMEN:  normal bowel sounds, soft, nontender, no hepatosplenomegaly or other masses  M/S:   patient sitting up in w/c  SKIN:  Inspection of skin and subcutaneous tissue baseline  NEURO:   non verbal  PSYCH:  affect and mood normal    Recent labs in [facility] reviewed by me today.  and Most Recent 3 CBC's:  Recent Labs   Lab Test 01/05/24  0959 01/04/24  0719 01/03/24  0720 01/02/24  1141   WBC  --  12.1* 9.2 15.0*   HGB 8.3* 8.4* 9.8* 10.8*   MCV  --  97 96 94   PLT  --  223 263 289     Most Recent 3 BMP's:  Recent Labs   Lab Test " 01/06/24  0906 01/05/24  1904 01/04/24  0719 01/03/24  0720 01/02/24  1915 01/02/24  1141   NA  --   --  140 143  --  138   POTASSIUM  --   --  4.1 3.9  --  4.9   CHLORIDE  --   --  106 106  --  104   CO2  --   --  27 26  --  25   BUN  --   --  16.7 16.7  --  18.1   CR  --   --  0.73 0.78  --  0.68   ANIONGAP  --   --  7 11  --  9   DAYA  --   --  8.6* 8.9  --  9.3   GLC 94 114* 115* 127*   < > 142*    < > = values in this interval not displayed.       Assessment/Plan:  (S72.001D) Traumatic fracture of right hip with routine healing  (primary encounter diagnosis)  (R53.81) Physical deconditioning  Comment: acute/ongoing s/p IM nailing Dr. Pearson 1/2/24, no change  Plan: PT and OT, change tylenol to 1000mg TID scheduled and qhs prn, atarax 10mg q 8 hours prn, oxycodone 2.5-5mg q 4 hours prn  ASA 81mg BID for 42 days Patient has a Hx of DVT  F/u with ortho as directed     (D62) Anemia due to blood loss, acute  Comment: acute on chronic  Hgb 8.2, no change  Plan: CBC pending,  may consider starting iron supplement     (E44.0) Moderate protein-calorie malnutrition (H24)  Comment: acute/ongoing, no change  Plan: dietician to follow     (K59.03) Drug-induced constipation  Comment: acute/ongoing,  no change  Plan: miralax 17gm QD, senna s 1 PO QD prn     (J44.9) Chronic obstructive pulmonary disease, unspecified COPD type (H)  Comment: ongoing, no change  Plan: monitor SaO2 at rest and with activity          MED REC REQUIRED  Post Medication Reconciliation Status: medication reconcilation previously completed during another office visit      Orders:  No new orders      Electronically signed by: Tonya Lynn Haase, APRN CNP

## 2024-01-11 NOTE — TELEPHONE ENCOUNTER
[facility] Geriatrics Lab Note     Provider: Tonya Haase, APRN CNP  Facility: [facility] Facility Type:  LTC    Allergies   Allergen Reactions    Penicillins        Labs Reviewed by provider: Heme 1, BMP     Verbal Order/Direction given by Provider: Check a straight cath UA/UC.  Check CBC with diff on 1/18/24.      Provider giving Order:  Tonya Haase, APRN CNP    Verbal Order given to: Evan Hull RN

## 2024-01-14 ENCOUNTER — TELEPHONE (OUTPATIENT)
Dept: GERIATRICS | Facility: CLINIC | Age: 79
End: 2024-01-14
Payer: MEDICARE

## 2024-01-14 NOTE — TELEPHONE ENCOUNTER
[place] GERIATRIC SERVICES TELEPHONE ENCOUNTER    Chief Complaint   Patient presents with    Urinary Problem       Mariah Winslow is a 78 year old  (1945),Nurse called today to report: staff having difficult obtaining urine sample this weekend. Attempted to st cath with no success, along with attempting to complete a clean cath with continued difficulty.     ASSESSMENT/PLAN    Monitor for now. I did advise staff to continue to obtain throughout weekend and follow up with primary tomorrow AM    Electronically signed by:   DASIA Morales CNP

## 2024-01-15 ENCOUNTER — TELEPHONE (OUTPATIENT)
Dept: GERIATRICS | Facility: CLINIC | Age: 79
End: 2024-01-15
Payer: MEDICARE

## 2024-01-15 NOTE — TELEPHONE ENCOUNTER
[facility]    Provider: Tonya Haase, APRN CNP  Facility: [facility] Facility Type:  LTC    Caller: Jed  Call Back Number: [phone]    Allergies:    Allergies   Allergen Reactions    Penicillins         Reason for call: Nurse is calling to report that they have been unable to obtain a straight cath UA/UC due to refusal and a unable to collect a clean catch UA/UC due to incontinence.  Patient has been afebrile.      Verbal Order/Direction given by Provider: Cancel the order for the UA/UC.      Provider giving Order:  Tonya Haase, APRN CNP    Verbal Order given to: Jed Hull RN

## 2024-01-16 ENCOUNTER — NURSING HOME VISIT (OUTPATIENT)
Dept: GERIATRICS | Facility: CLINIC | Age: 79
End: 2024-01-16
Payer: MEDICARE

## 2024-01-16 VITALS
HEIGHT: 64 IN | OXYGEN SATURATION: 96 % | RESPIRATION RATE: 17 BRPM | WEIGHT: 131.4 LBS | HEART RATE: 80 BPM | DIASTOLIC BLOOD PRESSURE: 68 MMHG | BODY MASS INDEX: 22.43 KG/M2 | SYSTOLIC BLOOD PRESSURE: 118 MMHG | TEMPERATURE: 97.5 F

## 2024-01-16 DIAGNOSIS — E44.0 MODERATE PROTEIN-CALORIE MALNUTRITION (H): ICD-10-CM

## 2024-01-16 DIAGNOSIS — S72.001D TRAUMATIC FRACTURE OF RIGHT HIP WITH ROUTINE HEALING: Primary | ICD-10-CM

## 2024-01-16 DIAGNOSIS — J44.9 CHRONIC OBSTRUCTIVE PULMONARY DISEASE, UNSPECIFIED COPD TYPE (H): ICD-10-CM

## 2024-01-16 DIAGNOSIS — D62 ANEMIA DUE TO BLOOD LOSS, ACUTE: ICD-10-CM

## 2024-01-16 DIAGNOSIS — K59.03 DRUG-INDUCED CONSTIPATION: ICD-10-CM

## 2024-01-16 DIAGNOSIS — R53.81 PHYSICAL DECONDITIONING: ICD-10-CM

## 2024-01-16 PROCEDURE — 99310 SBSQ NF CARE HIGH MDM 45: CPT | Performed by: NURSE PRACTITIONER

## 2024-01-16 NOTE — LETTER
"    1/16/2024        RE: Mariah Winslow  [facility]  [address]  [facility] [address]        [facility] GERIATRICS    Chief Complaint   Patient presents with     RECHECK     HPI:  Mariah Winslow is a 78 year old  (1945), who is being seen today for an episodic care visit at: [facility] [[id number]]. [facility]         Chief Complaint   Patient presents with     RECHECK      HPI:  Mariah Winslow is a 78 year old  (1945), who is being seen today for an episodic care visit at: [facility]) [[id number]]. Today's concern is:   Traumatic right hip fracture: on exam today patient is sitting up in w/c, alert, she is non verbal, no concerns with pain control,  nursing states she appears to be comfortable, sleeping well at night, in therapy patient is working on standing   Anemia: Hgb 9.0 on 1/11/24  Malnutrition: weight ~131lbs, patient is eating meals, able to feed self, monitored at mealtime  COPD: SaO2  95-96% on room air       Allergies, and PMH/PSH reviewed in [facility] today.  REVIEW OF SYSTEMS:  Unobtainable secondary to cognitive impairment.     Objective:   /68   Pulse 80   Temp 97.5  F (36.4  C)   Resp 17   Ht 1.615 m (5' 3.6\")   Wt 59.6 kg (131 lb 6.4 oz)   LMP  (LMP Unknown)   SpO2 96%   BMI 22.84 kg/m    GENERAL APPEARANCE:  Alert, in no distress  ENT:  Mouth and posterior oropharynx normal, moist mucous membranes, [place]  EYES:  EOM, conjunctivae, lids, pupils and irises normal, PERRL  RESP:  respiratory effort and palpation of chest normal, lungs clear to auscultation , no respiratory distress  CV:  Palpation and auscultation of heart done , regular rate and rhythm, no murmur, rub, or gallop, no edema  ABDOMEN:  normal bowel sounds, soft, nontender, no hepatosplenomegaly or other masses  M/S:   patient sitting up in w/c  SKIN:  Inspection of skin and subcutaneous tissue baseline  NEURO:   non " verbal  PSYCH:  affect and mood normal    Recent labs in [facility] reviewed by me today.  and Most Recent 3 CBC's:  Recent Labs   Lab Test 01/05/24  0959 01/04/24  0719 01/03/24  0720 01/02/24  1141   WBC  --  12.1* 9.2 15.0*   HGB 8.3* 8.4* 9.8* 10.8*   MCV  --  97 96 94   PLT  --  223 263 289     Most Recent 3 BMP's:  Recent Labs   Lab Test 01/06/24  0906 01/05/24  1904 01/04/24  0719 01/03/24  0720 01/02/24  1915 01/02/24  1141   NA  --   --  140 143  --  138   POTASSIUM  --   --  4.1 3.9  --  4.9   CHLORIDE  --   --  106 106  --  104   CO2  --   --  27 26  --  25   BUN  --   --  16.7 16.7  --  18.1   CR  --   --  0.73 0.78  --  0.68   ANIONGAP  --   --  7 11  --  9   DAYA  --   --  8.6* 8.9  --  9.3   GLC 94 114* 115* 127*   < > 142*    < > = values in this interval not displayed.       Assessment/Plan:  (S72.001D) Traumatic fracture of right hip with routine healing  (primary encounter diagnosis)  (R53.81) Physical deconditioning  Comment: acute/ongoing s/p IM nailing Dr. Pearson 1/2/24, no change  Plan: PT and OT, continue tylenol  1000mg TID scheduled and qhs prn, atarax 10mg q 8 hours prn, oxycodone 2.5-5mg q 4 hours prn  ASA 81mg BID for 42 days Patient has a Hx of DVT  F/u with ortho as directed     (D62) Anemia due to blood loss, acute  Comment: acute on chronic  Hgb 9.0 on 1/11/24 trending up  Plan: CBC follow     (E44.0) Moderate protein-calorie malnutrition (H24)  Comment: acute/ongoing, no change  Plan: dietician to follow     (K59.03) Drug-induced constipation  Comment: acute/ongoing,  no change  Plan: miralax 17gm QD, senna s 1 PO QD prn     (J44.9) Chronic obstructive pulmonary disease, unspecified COPD type (H)  Comment: ongoing, no change  Plan: monitor SaO2 at rest and with activity          MED REC REQUIRED  Post Medication Reconciliation Status: medication reconcilation previously completed during another office visit      Orders:  No new ordrs      Electronically signed by: Tonya Lynn Haase, APRN  CNP         Sincerely,        Tonya Lynn Haase, APRN CNP

## 2024-01-16 NOTE — PROGRESS NOTES
"[facility] GERIATRICS    Chief Complaint   Patient presents with    RECHECK     HPI:  Mariah Winslow is a 78 year old  (1945), who is being [facility] today [facility] [[id number]]. [facility]         Chief Complaint   Patient presents with    RECHECK      HPI:  Mariah Winslow is a 78 year old  (1945), who is being seen today for an episodic care visit at: [facility]) [[id number]]. Today's concern is:   Traumatic right hip fracture: on exam today patient is sitting up in w/c, alert, she is non verbal, no concerns with pain control,  nursing states she appears to be comfortable, sleeping well at night, in therapy patient is working on standing   Anemia: Hgb 9.0 on 1/11/24  Malnutrition: weight ~131lbs, patient is eating meals, able to feed self, monitored at mealtime  COPD: SaO2  95-96% on room air       Allergies, and PMH/PSH reviewed in [facility] today.  REVIEW OF SYSTEMS:  Unobtainable secondary to cognitive impairment.     Objective:   /68   Pulse 80   Temp 97.5  F (36.4  C)   Resp 17   Ht 1.615 m (5' 3.6\")   Wt 59.6 kg (131 lb 6.4 oz)   LMP  (LMP Unknown)   SpO2 96%   BMI 22.84 kg/m    GENERAL APPEARANCE:  Alert, in no distress  ENT:  Mouth and posterior oropharynx normal, moist mucous membranes, [place]  EYES:  EOM, conjunctivae, lids, pupils and irises normal, PERRL  RESP:  respiratory effort and palpation of chest normal, lungs clear to auscultation , no respiratory distress  CV:  Palpation and auscultation of heart done , regular rate and rhythm, no murmur, rub, or gallop, no edema  ABDOMEN:  normal bowel sounds, soft, nontender, no hepatosplenomegaly or other masses  M/S:   patient sitting up in w/c  SKIN:  Inspection of skin and subcutaneous tissue baseline  NEURO:   non verbal  PSYCH:  affect and mood normal    Recent labs in [facility] reviewed by me today.  and Most Recent 3 CBC's:  Recent " Labs   Lab Test 01/05/24  0959 01/04/24  0719 01/03/24  0720 01/02/24  1141   WBC  --  12.1* 9.2 15.0*   HGB 8.3* 8.4* 9.8* 10.8*   MCV  --  97 96 94   PLT  --  223 263 289     Most Recent 3 BMP's:  Recent Labs   Lab Test 01/06/24  0906 01/05/24  1904 01/04/24  0719 01/03/24  0720 01/02/24  1915 01/02/24  1141   NA  --   --  140 143  --  138   POTASSIUM  --   --  4.1 3.9  --  4.9   CHLORIDE  --   --  106 106  --  104   CO2  --   --  27 26  --  25   BUN  --   --  16.7 16.7  --  18.1   CR  --   --  0.73 0.78  --  0.68   ANIONGAP  --   --  7 11  --  9   DAYA  --   --  8.6* 8.9  --  9.3   GLC 94 114* 115* 127*   < > 142*    < > = values in this interval not displayed.       Assessment/Plan:  (S72.001D) Traumatic fracture of right hip with routine healing  (primary encounter diagnosis)  (R53.81) Physical deconditioning  Comment: acute/ongoing s/p IM nailing Dr. Pearson 1/2/24, no change  Plan: PT and OT, continue tylenol  1000mg TID scheduled and qhs prn, atarax 10mg q 8 hours prn, oxycodone 2.5-5mg q 4 hours prn  ASA 81mg BID for 42 days Patient has a Hx of DVT  F/u with ortho as directed     (D62) Anemia due to blood loss, acute  Comment: acute on chronic  Hgb 9.0 on 1/11/24 trending up  Plan: CBC follow     (E44.0) Moderate protein-calorie malnutrition (H24)  Comment: acute/ongoing, no change  Plan: dietician to follow     (K59.03) Drug-induced constipation  Comment: acute/ongoing,  no change  Plan: miralax 17gm QD, senna s 1 PO QD prn     (J44.9) Chronic obstructive pulmonary disease, unspecified COPD type (H)  Comment: ongoing, no change  Plan: monitor SaO2 at rest and with activity          MED REC REQUIRED  Post Medication Reconciliation Status: medication reconcilation previously completed during another office visit      Orders:  No new ordrs      Electronically signed by: Tonya Lynn Haase, APRN CNP

## 2024-02-02 ENCOUNTER — NURSING HOME VISIT (OUTPATIENT)
Dept: GERIATRICS | Facility: CLINIC | Age: 79
End: 2024-02-02
Payer: MEDICARE

## 2024-02-02 DIAGNOSIS — F03.90 DEMENTIA WITHOUT BEHAVIORAL DISTURBANCE (H): Primary | ICD-10-CM

## 2024-02-02 DIAGNOSIS — D62 ANEMIA DUE TO BLOOD LOSS, ACUTE: ICD-10-CM

## 2024-02-02 DIAGNOSIS — J44.9 CHRONIC OBSTRUCTIVE PULMONARY DISEASE, UNSPECIFIED COPD TYPE (H): ICD-10-CM

## 2024-02-02 DIAGNOSIS — S72.001D TRAUMATIC FRACTURE OF RIGHT HIP WITH ROUTINE HEALING: ICD-10-CM

## 2024-02-02 PROCEDURE — 99304 1ST NF CARE SF/LOW MDM 25: CPT | Performed by: INTERNAL MEDICINE

## 2024-02-04 NOTE — PROGRESS NOTES
Pt was seen for an initial LTC visit with this provider    Course reviewed in Epic  Discussed with NH staff      PM  Dementia  Depression on paroxetine  R IT femur fracture 2/2024, s/p IM nailing  Acute BL anemia  Hgb 9 on 1/11/24  R adnexal mass noted on CT pelvis 1/2/24  COPD      Current medications were reviewed by me today    No concerns per nursing staff  Pt is staring to  therapy    Pt is unable to contribute to history secondary to cognitive impairment    FH, SH reviewed      Exam  Thin appearing female, sitting comfortably in chair  Alert, non-verbal  HEENT oral mucosa moist  Lungs clear  CV rrr  Abd soft  LE no edema  R hip incision was not examined by me today  NEURO face symmetric, no gross focal weakness; assessment limited by cognitive impairment        Assessment/Plan    Dementia/depression  Stable on current medication regimen  Plan continue current cares on memory care unit    Recent R femur fracture  Pain appears controlled  Slow progress in therapies    Acute BL anemia  Stable as of 1/11/24    History of COPD  Stable on no medications  Plan monitor resp status    History malnutrition  Adequate intake per staff, wt stable  Plan monitor intake, wt        Meom Azevedo MD

## 2024-02-13 ENCOUNTER — NURSING HOME VISIT (OUTPATIENT)
Dept: GERIATRICS | Facility: CLINIC | Age: 79
End: 2024-02-13
Payer: MEDICARE

## 2024-02-13 VITALS
DIASTOLIC BLOOD PRESSURE: 75 MMHG | WEIGHT: 132 LBS | TEMPERATURE: 98 F | RESPIRATION RATE: 17 BRPM | SYSTOLIC BLOOD PRESSURE: 107 MMHG | HEART RATE: 58 BPM | OXYGEN SATURATION: 98 % | HEIGHT: 63 IN | BODY MASS INDEX: 23.39 KG/M2

## 2024-02-13 DIAGNOSIS — R53.81 PHYSICAL DECONDITIONING: ICD-10-CM

## 2024-02-13 DIAGNOSIS — E44.0 MODERATE PROTEIN-CALORIE MALNUTRITION (H): ICD-10-CM

## 2024-02-13 DIAGNOSIS — J44.9 CHRONIC OBSTRUCTIVE PULMONARY DISEASE, UNSPECIFIED COPD TYPE (H): ICD-10-CM

## 2024-02-13 DIAGNOSIS — S72.001D TRAUMATIC FRACTURE OF RIGHT HIP WITH ROUTINE HEALING: ICD-10-CM

## 2024-02-13 DIAGNOSIS — D62 ANEMIA DUE TO BLOOD LOSS, ACUTE: ICD-10-CM

## 2024-02-13 DIAGNOSIS — F03.90 DEMENTIA WITHOUT BEHAVIORAL DISTURBANCE (H): Primary | ICD-10-CM

## 2024-02-13 DIAGNOSIS — K59.03 DRUG-INDUCED CONSTIPATION: ICD-10-CM

## 2024-02-13 PROCEDURE — 99310 SBSQ NF CARE HIGH MDM 45: CPT | Performed by: NURSE PRACTITIONER

## 2024-02-13 NOTE — PROGRESS NOTES
"[facility] GERIATRICS    Chief Complaint   Patient presents with    RECHECK     HPI:  Mariah Winslow is a 78 year old  (1945), who is being seen today for an episodic care visit at: [facility] () [[id number]]. Today's concern is:   Traumatic right hip fracture: on exam today patient is standing up working with therapy  alert, she is non verbal, appears to be comfortable, sleeping well at night, in therapy patient is walking up to 100 feet using a RW, needing a lot of cues and has difficulty sequencing, patient is not consistent with movement and tranfers from day to day   Anemia: Hgb 9.0 on 1/11/24  Malnutrition: weight ~132.4lbs, patient is eating meals, able to feed self, monitored at mealtime  COPD: SaO2  94-98% on room air        Allergies, and PMH/PSH reviewed in [facility] today.  REVIEW OF SYSTEMS:  Unobtainable secondary to cognitive impairment.     Objective:   /75   Pulse 58   Temp 98  F (36.7  C)   Resp 17   Ht 1.6 m (5' 3\")   Wt 59.9 kg (132 lb)   LMP  (LMP Unknown)   SpO2 98%   BMI 23.38 kg/m    GENERAL APPEARANCE:  Alert, in no distress, thin  ENT:  Mouth and posterior oropharynx normal, moist mucous membranes, [place]  EYES:  EOM, conjunctivae, lids, pupils and irises normal  RESP:  respiratory effort and palpation of chest normal, lungs clear to auscultation , no respiratory distress  CV:  Palpation and auscultation of heart done , regular rate and rhythm, no murmur, rub, or gallop  ABDOMEN:  normal bowel sounds, soft, nontender, no hepatosplenomegaly or other masses  M/S:   patient standing with therapy  SKIN:  Inspection of skin and subcutaneous tissue baseline  NEURO:   patient non verbal  PSYCH:  affect and mood normal    Recent labs in [facility] reviewed by me today.  and Most Recent 3 CBC's:  Recent Labs   Lab Test 01/05/24  0959 01/04/24  0719 01/03/24  0720 01/02/24  1141   WBC  --  12.1* 9.2 15.0*   HGB 8.3* 8.4* 9.8* 10.8*   MCV  --  97 96 94   PLT  --  223 263 289 "     Most Recent 3 BMP's:  Recent Labs   Lab Test 01/06/24  0906 01/05/24  1904 01/04/24  0719 01/03/24  0720 01/02/24  1915 01/02/24  1141   NA  --   --  140 143  --  138   POTASSIUM  --   --  4.1 3.9  --  4.9   CHLORIDE  --   --  106 106  --  104   CO2  --   --  27 26  --  25   BUN  --   --  16.7 16.7  --  18.1   CR  --   --  0.73 0.78  --  0.68   ANIONGAP  --   --  7 11  --  9   DAYA  --   --  8.6* 8.9  --  9.3   GLC 94 114* 115* 127*   < > 142*    < > = values in this interval not displayed.       Assessment/Plan:  (S72.001D) Traumatic fracture of right hip with routine healing  (primary encounter diagnosis)  (R53.81) Physical deconditioning  Comment: acute/ongoing s/p IM nailing Dr. Pearson 1/2/24, no change  Plan: PT and OT, continue tylenol  1000mg TID scheduled and qhs prn, atarax 10mg q 8 hours prn, oxycodone 2.5-5mg q 4 hours prn  ASA 81mg BID for 42 days Patient has a Hx of DVT  F/u with ortho as directed  Ordering a w/c for home use  Ordering a sit to stand lift for transfers     (D62) Anemia due to blood loss, acute  Comment: acute on chronic, no change  Hgb 9.0 on 1/11/24 trending up  Plan: CBC follow     (E44.0) Moderate protein-calorie malnutrition (H24)  Comment: acute/ongoing, no  change  Plan: dietician to follow     (K59.03) Drug-induced constipation  Comment: acute/ongoing,  no change  Plan: miralax 17gm QD, senna s 1 PO QD prn     (J44.9) Chronic obstructive pulmonary disease, unspecified COPD type (H)  Comment: ongoing, no change  Plan: monitor SaO2 at rest and with activity    MED REC REQUIRED  Post Medication Reconciliation Status: medication reconcilation previously completed during another office visit      Orders:  Ordering a w/c for mobility  sit to stand lift for transfers      Wheelchair Documentation  Size: 18 x 16  Corresponding cushion: Yes: comfort curve  Standard foot rests: Yes  Elevating leg rests: No  Arm rests: Yes: full length  Lap tray: No  Dose the patient use oxygen? No   Is  the patient able to propel wheelchair? No If no why not? yes And is there someone who can?staff  1. The patient has mobility limitations that impairs their ability to participate in one or more mobility related activities: Toileting, Feeding, Grooming, and Bathing.  The wheelchair is suitable and necessary for use in the patient's home.  2. The patient's mobility limitations cannot be safely resolved by using a cane/walker:Yes    Reason why a cane or walker will not meet the patient's needs. (ie: balance, tolerance, level of assistance) requires CGA for walking with therapy   3. The patients home has adequate access to use a manual wheelchair:Yes  4. The use of a manual wheelchair on a regular basis will improve the patients ability to participate in mobility related ADL's at home:Yes  5. The patient is willing to use a manual wheelchair at home:Yes  6. The patient has adequate upper body strength and the mental capability to safely use a manual wheelchair and/or has a caregiver that is able to assist: Yes  7. Does the patient have a lower extremity injury or edema?Yes  Reason for Type of Wheelchair  Patient weight: 0 lbs 0 oz      Face to Face and Medical Necessity Statement for DME Provider visit    Demographic Information on Mariah Winslow:  Gender: female  : 1945  [address] [address]  [phone] (home)     Medical Record: [id number]  Social Security Number: [id number]  Primary Care Provider: Haase, Tonya Lynn  Insurance: Payor: MEDICARE / Plan: MEDICARE / Product Type: Medicare /     HPI:   Mariah Winslow is a 78 year old  (1945), who is being seen today for a face to face provider visit at [facility]; medical necessity statement for DME included. This patient requires the following:  DME Ordered and Medical Necessity Statement     Sit to stand lift with sling to assist with transfers, patient requires extensive assist for transfers with therapy, not  "consistent in participation on transfers, requires multiple cues for sequencing  Patient would be confined to bed without the use of sit to stand lift          Pt needing above DME with expected length of need of  99  months  due to medical necessity associated with following diagnosis:     Dementia without behavioral disturbance (H)  Traumatic fracture of right hip with routine healing  Anemia due to blood loss, acute  Chronic obstructive pulmonary disease, unspecified COPD type (H)  Physical deconditioning  Moderate protein-calorie malnutrition (H24)  Drug-induced constipation      PMH   has a past medical history of Dementia (H), DVT (deep venous thrombosis) (H), and Major depressive disorder, recurrent episode, moderate (H) (04/29/2014).    ROS:Unobtainable secondary to cognitive impairment.     EXAM  Vitals: /75   Pulse 58   Temp 98  F (36.7  C)   Resp 17   Ht 1.6 m (5' 3\")   Wt 59.9 kg (132 lb)   LMP  (LMP Unknown)   SpO2 98%   BMI 23.38 kg/m  ;BMI= Body mass index is 23.38 kg/m .  See above for PE     ASSESSMENT/PLAN:  1. Advanced Dementia without Behaviors     2. Traumatic fracture of right hip with routine healing    3. Anemia due to blood loss, acute    4. Chronic obstructive pulmonary disease, unspecified COPD type (H)    5. Physical deconditioning    6. Moderate protein-calorie malnutrition (H24)    7. Drug-induced constipation        Orders:  1. Facility staff/TC to contact [employer] company to get their order form for provider to fill out    ELECTRONICALLY SIGNED BY [place] CERTIFIED PROVIDER:  Tonya Lynn Haase, APRN CNP   NPI: [id number]  [place] [facility]  [address] [place] [address], [address]  [place] [address]          Electronically signed by: Tonya Lynn Haase, APRN CNP       "

## 2024-02-13 NOTE — LETTER
"    2/13/2024        RE: Mariah Winslow  [facility]  [address]  [facility] [address]        [facility]    Chief Complaint   Patient presents with     RECHECK     HPI:  Mariah Winslow is a 78 year old  (1945), who is being seen today for an episodic care visit at: [facility]) [[id number]]. Today's concern is:   Traumatic right hip fracture: on exam today patient is standing up working with therapy  alert, she is non verbal, appears to be comfortable, sleeping well at night, in therapy patient is walking up to 100 feet using a RW, needing a lot of cues and has difficulty sequencing, patient is not consistent with movement and tranfers from day to day   Anemia: Hgb 9.0 on 1/11/24  Malnutrition: weight ~132.4lbs, patient is eating meals, able to feed self, monitored at mealtime  COPD: SaO2  94-98% on room air        Allergies, and PMH/PSH reviewed in [facility] today.  REVIEW OF SYSTEMS:  Unobtainable secondary to cognitive impairment.     Objective:   /75   Pulse 58   Temp 98  F (36.7  C)   Resp 17   Ht 1.6 m (5' 3\")   Wt 59.9 kg (132 lb)   LMP  (LMP Unknown)   SpO2 98%   BMI 23.38 kg/m    GENERAL APPEARANCE:  Alert, in no distress, thin  ENT:  Mouth and posterior oropharynx normal, moist mucous membranes, [place]  EYES:  EOM, conjunctivae, lids, pupils and irises normal  RESP:  respiratory effort and palpation of chest normal, lungs clear to auscultation , no respiratory distress  CV:  Palpation and auscultation of heart done , regular rate and rhythm, no murmur, rub, or gallop  ABDOMEN:  normal bowel sounds, soft, nontender, no hepatosplenomegaly or other masses  M/S:   patient standing with therapy  SKIN:  Inspection of skin and subcutaneous tissue baseline  NEURO:   patient non verbal  PSYCH:  affect and mood normal    Recent labs in [facility] reviewed by me today.  and Most Recent 3 CBC's:  Recent Labs   Lab Test 01/05/24  0959 01/04/24  0719 01/03/24  0720 " 01/02/24  1141   WBC  --  12.1* 9.2 15.0*   HGB 8.3* 8.4* 9.8* 10.8*   MCV  --  97 96 94   PLT  --  223 263 289     Most Recent 3 BMP's:  Recent Labs   Lab Test 01/06/24  0906 01/05/24  1904 01/04/24  0719 01/03/24  0720 01/02/24  1915 01/02/24  1141   NA  --   --  140 143  --  138   POTASSIUM  --   --  4.1 3.9  --  4.9   CHLORIDE  --   --  106 106  --  104   CO2  --   --  27 26  --  25   BUN  --   --  16.7 16.7  --  18.1   CR  --   --  0.73 0.78  --  0.68   ANIONGAP  --   --  7 11  --  9   DAYA  --   --  8.6* 8.9  --  9.3   GLC 94 114* 115* 127*   < > 142*    < > = values in this interval not displayed.       Assessment/Plan:  (S72.001D) Traumatic fracture of right hip with routine healing  (primary encounter diagnosis)  (R53.81) Physical deconditioning  Comment: acute/ongoing s/p IM nailing Dr. Pearson 1/2/24, no change  Plan: PT and OT, continue tylenol  1000mg TID scheduled and qhs prn, atarax 10mg q 8 hours prn, oxycodone 2.5-5mg q 4 hours prn  ASA 81mg BID for 42 days Patient has a Hx of DVT  F/u with ortho as directed  Ordering a w/c for home use  Ordering a sit to stand lift for transfers     (D62) Anemia due to blood loss, acute  Comment: acute on chronic, no change  Hgb 9.0 on 1/11/24 trending up  Plan: CBC follow     (E44.0) Moderate protein-calorie malnutrition (H24)  Comment: acute/ongoing, no  change  Plan: dietician to follow     (K59.03) Drug-induced constipation  Comment: acute/ongoing,  no change  Plan: miralax 17gm QD, senna s 1 PO QD prn     (J44.9) Chronic obstructive pulmonary disease, unspecified COPD type (H)  Comment: ongoing, no change  Plan: monitor SaO2 at rest and with activity    MED REC REQUIRED  Post Medication Reconciliation Status: medication reconcilation previously completed during another office visit      Orders:  Ordering a w/c for mobility  sit to stand lift for transfers      Wheelchair Documentation  Size: 18 x 16  Corresponding cushion: Yes: comfort curve  Standard foot  rests: Yes  Elevating leg rests: No  Arm rests: Yes: full length  Lap tray: No  Dose the patient use oxygen? No   Is the patient able to propel wheelchair? No If no why not? yes And is there someone who can?staff  1. The patient has mobility limitations that impairs their ability to participate in one or more mobility related activities: Toileting, Feeding, Grooming, and Bathing.  The wheelchair is suitable and necessary for use in the patient's home.  2. The patient's mobility limitations cannot be safely resolved by using a cane/walker:Yes    Reason why a cane or walker will not meet the patient's needs. (ie: balance, tolerance, level of assistance) requires CGA for walking with therapy   3. The patients home has adequate access to use a manual wheelchair:Yes  4. The use of a manual wheelchair on a regular basis will improve the patients ability to participate in mobility related ADL's at home:Yes  5. The patient is willing to use a manual wheelchair at home:Yes  6. The patient has adequate upper body strength and the mental capability to safely use a manual wheelchair and/or has a caregiver that is able to assist: Yes  7. Does the patient have a lower extremity injury or edema?Yes  Reason for Type of Wheelchair  Patient weight: 0 lbs 0 oz      Face to Face and Medical Necessity Statement for DME Provider visit    Demographic Information on Mariah Winslow:  Gender: female  : 1945  [place] [address] [facility] [address]  [phone] (home)     Medical Record: [id number]  Social Security Number: [id number]  Primary Care Provider: Haase, Tonya Lynn  Insurance: Payor: MEDICARE / Plan: MEDICARE / Product Type: Medicare /     HPI:   Mariah Winslow is a 78 year old  (1945), who is being seen today for a face to face provider visit at [facility]; medical necessity statement for DME included. This patient requires the following:  DME Ordered and Medical Necessity Statement     Sit to  "stand lift with sling to assist with transfers, patient requires extensive assist for transfers with therapy, not consistent in participation on transfers, requires multiple cues for sequencing  Patient would be confined to bed without the use of sit to stand lift          Pt needing above DME with expected length of need of  99  months  due to medical necessity associated with following diagnosis:     Dementia without behavioral disturbance (H)  Traumatic fracture of right hip with routine healing  Anemia due to blood loss, acute  Chronic obstructive pulmonary disease, unspecified COPD type (H)  Physical deconditioning  Moderate protein-calorie malnutrition (H24)  Drug-induced constipation      PMH   has a past medical history of Dementia (H), DVT (deep venous thrombosis) (H), and Major depressive disorder, recurrent episode, moderate (H) (04/29/2014).    ROS:Unobtainable secondary to cognitive impairment.     EXAM  Vitals: /75   Pulse 58   Temp 98  F (36.7  C)   Resp 17   Ht 1.6 m (5' 3\")   Wt 59.9 kg (132 lb)   LMP  (LMP Unknown)   SpO2 98%   BMI 23.38 kg/m  ;BMI= Body mass index is 23.38 kg/m .  See above for PE     ASSESSMENT/PLAN:  1. Advanced Dementia without Behaviors     2. Traumatic fracture of right hip with routine healing    3. Anemia due to blood loss, acute    4. Chronic obstructive pulmonary disease, unspecified COPD type (H)    5. Physical deconditioning    6. Moderate protein-calorie malnutrition (H24)    7. Drug-induced constipation        Orders:  1. Facility staff/TC to contact [employer] to get their order form for provider to fill out    ELECTRONICALLY SIGNED BY SAHILOS CERTIFIED PROVIDER:  Tonya Lynn Haase, APRN CNP   NPI: [id number]  [facility]  [address] [place] [address], [address]  [place] [address]          Electronically signed by: Tonya Lynn Haase, APRN CNP         Sincerely,        Tonya Lynn Haase, APRN CNP      "

## 2024-02-19 ENCOUNTER — DISCHARGE SUMMARY NURSING HOME (OUTPATIENT)
Dept: GERIATRICS | Facility: CLINIC | Age: 79
End: 2024-02-19
Payer: MEDICARE

## 2024-02-19 VITALS
SYSTOLIC BLOOD PRESSURE: 120 MMHG | RESPIRATION RATE: 16 BRPM | DIASTOLIC BLOOD PRESSURE: 60 MMHG | OXYGEN SATURATION: 95 % | TEMPERATURE: 97.7 F | HEIGHT: 63 IN | BODY MASS INDEX: 23.04 KG/M2 | WEIGHT: 130 LBS | HEART RATE: 70 BPM

## 2024-02-19 DIAGNOSIS — E44.0 MODERATE PROTEIN-CALORIE MALNUTRITION (H): ICD-10-CM

## 2024-02-19 DIAGNOSIS — S72.001D TRAUMATIC FRACTURE OF RIGHT HIP WITH ROUTINE HEALING: ICD-10-CM

## 2024-02-19 DIAGNOSIS — F03.90 DEMENTIA WITHOUT BEHAVIORAL DISTURBANCE (H): Primary | ICD-10-CM

## 2024-02-19 DIAGNOSIS — J44.9 CHRONIC OBSTRUCTIVE PULMONARY DISEASE, UNSPECIFIED COPD TYPE (H): ICD-10-CM

## 2024-02-19 DIAGNOSIS — K59.03 DRUG-INDUCED CONSTIPATION: ICD-10-CM

## 2024-02-19 DIAGNOSIS — D62 ANEMIA DUE TO BLOOD LOSS, ACUTE: ICD-10-CM

## 2024-02-19 DIAGNOSIS — R53.81 PHYSICAL DECONDITIONING: ICD-10-CM

## 2024-02-19 PROCEDURE — 99316 NF DSCHRG MGMT 30 MIN+: CPT | Performed by: NURSE PRACTITIONER

## 2024-02-19 NOTE — PROGRESS NOTES
[facility] GERIATRICS DISCHARGE SUMMARY  PATIENT'S NAME: Mariah Winslow  YOB: 1945  MEDICAL RECORD NUMBER:  [id number]  Place of Service where encounter took place:  [facility] () [[id number]]    PRIMARY CARE PROVIDER AND CLINIC RESPONSIBLE AFTER TRANSFER:   Tonya Lynn Haase, APRN CNP, [address] [facility] [address]    Assisted Living: [facility]       Transferring providers: Tonya Lynn Haase, APRN CNP, Memo Azevedo MD  Recent Hospitalization/ED:  [facility] Hospital stay 1/2/24 to 1/6/24.  Date of SNF Admission: January 06, 2024  Date of SNF (anticipated) Discharge: February 20, 2024  Discharged to: previous assisted living  Cognitive Scores:  non verbal  Physical Function: Ambulating 100 ft with CGA  DME: Wheelchair and Walker    CODE STATUS/ADVANCE DIRECTIVES DISCUSSION:  Full Code   ALLERGIES: Clindamycin, Guaifenesin, Penicillins, and Erythromycin base    NURSING FACILITY COURSE   Medication Changes/Rationale:   See assessment and plan    Summary of nursing facility stay:   Patient progressed to walking up to 100 feet using a RW with CGA, transfers with therapy not consistent, an EZ stand lift was ordered for tranfers at [facility], total assist with cares, will discharge to [facility] with home PT, OT, ST through [facility] therapy    Discharge Medications:  MED REC REQUIRED  Post Medication Reconciliation Status: discharge medications reconciled and changed, per note/orders       Current Outpatient Medications   Medication Sig Dispense Refill    acetaminophen (TYLENOL) 500 MG tablet Take 2 tablets (1,000 mg) by mouth 3 times daily      ammonium lactate (LAC-HYDRIN) 12 % external lotion Apply topically daily      hydrOXYzine HCl (ATARAX) 10 MG tablet Take 1 tablet (10 mg) by mouth 3 times daily as needed for other (pain adjunct)      loperamide (IMODIUM) 2 MG capsule Take 4 mg by mouth daily as needed for diarrhea      oxyCODONE  "(ROXICODONE) 5 MG tablet Take 0.5-1 tablets (2.5-5 mg) by mouth every 4 hours as needed for moderate to severe pain (Take 2.5 mg (1/2 tab) for pain 4-6/10, take 5 mg (1 tab) for pain 7-10/10) 20 tablet 0    PARoxetine (PAXIL) 20 MG tablet Take 20 mg by mouth At Bedtime      polyethylene glycol (MIRALAX) 17 GM/Dose powder Take 17 g by mouth daily      senna-docusate (SENOKOT-S/PERICOLACE) 8.6-50 MG tablet Take 1 tablet by mouth daily as needed for constipation      vitamin D3 (CHOLECALCIFEROL) 50 mcg (2000 units) tablet Take 1 tablet by mouth daily            Controlled medications:   not applicable/none     Past Medical History:   Past Medical History:   Diagnosis Date    Dementia (H)     DVT (deep venous thrombosis) (H)     Major depressive disorder, recurrent episode, moderate (H) 04/29/2014     Physical Exam:   Vitals: /60   Pulse 70   Temp 97.7  F (36.5  C)   Resp 16   Ht 1.6 m (5' 3\")   Wt 59 kg (130 lb)   LMP  (LMP Unknown)   SpO2 95%   BMI 23.03 kg/m    BMI: Body mass index is 23.03 kg/m .  GENERAL APPEARANCE:  Alert, in no distress  ENT:  Mouth and posterior oropharynx normal, moist mucous membranes, [place]  EYES:  EOM, conjunctivae, lids, pupils and irises normal, PERRL  RESP:  respiratory effort and palpation of chest normal, lungs clear to auscultation , no respiratory distress  CV:  Palpation and auscultation of heart done , regular rate and rhythm, no murmur, rub, or gallop, no edema  ABDOMEN:  normal bowel sounds, soft, nontender, no hepatosplenomegaly or other masses  M/S:   sitting up in w/c  SKIN:  Inspection of skin and subcutaneous tissue baseline  NEURO:   non verbal  PSYCH:  affect and mood normal     SNF labs: Recent labs in [facility] reviewed by me today.  and Most Recent 3 CBC's:  Recent Labs   Lab Test 01/05/24  0959 01/04/24  0719 01/03/24  0720 01/02/24  1141   WBC  --  12.1* 9.2 15.0*   HGB 8.3* 8.4* 9.8* 10.8*   MCV  --  97 96 94   PLT  --  223 263 289     Most Recent 3 " BMP's:  Recent Labs   Lab Test 01/06/24  0906 01/05/24  1904 01/04/24  0719 01/03/24  0720 01/02/24  1915 01/02/24  1141   NA  --   --  140 143  --  138   POTASSIUM  --   --  4.1 3.9  --  4.9   CHLORIDE  --   --  106 106  --  104   CO2  --   --  27 26  --  25   BUN  --   --  16.7 16.7  --  18.1   CR  --   --  0.73 0.78  --  0.68   ANIONGAP  --   --  7 11  --  9   DAYA  --   --  8.6* 8.9  --  9.3   GLC 94 114* 115* 127*   < > 142*    < > = values in this interval not displayed.       Assessment/Plan:  (S72.001D) Traumatic fracture of right hip with routine healing  (primary encounter diagnosis)  (R53.81) Physical deconditioning  Comment: acute/ongoing s/p IM nailing Dr. Pearson 1/2/24  Plan: Discharge to [facility] with home PT, OT, ST through [facility] therapy,  continue tylenol  1000mg TID scheduled and qhs prn  Discontinue atarax and oxycodone at discharge  ASA 81mg QD  F/u with ortho as directed  Ordering a w/c for home use  Ordering a sit to stand lift for transfers     (D62) Anemia due to blood loss, acute  Comment: acute on chronic  Hgb 9.0 on 1/11/24 trending up  Plan:f/u with PCP     (E44.0) Moderate protein-calorie malnutrition (H24)  Comment: acute/ongoing, no change  Plan: dietician to follow     (K59.03) Drug-induced constipation  Comment: acute/ongoing,  no change  Plan: miralax 17gm QD, senna s 1 PO QD prn     (J44.9) Chronic obstructive pulmonary disease, unspecified COPD type (H)  Comment: ongoing, no change  Plan: F/u with PCP    DISCHARGE PLAN:  Follow up labs: No labs orders/due  Medical Follow Up:      Follow up with primary care provider in 1-2 weeks  [facility] scheduled appointments:  Appointments in Next Year      Feb 19, 2024 12:30 PM  Discharge Summary with Tonya Lynn Haase, APRN [facility] ([facility] ) [phone]           Discharge Services: Home Care:  Occupational Therapy, Physical Therapy, and Speech Therapy    Discharge Instructions Verbalized to Patient at Discharge:   None    TOTAL DISCHARGE TIME:   Greater than 30 minutes  Electronically signed by:  Tonya Lynn Haase, APRN CNP     Home care Face to Face documentation done in EPIC attached to Home care orders for [facility]. , Documentation of Face to Face and Certification for Home Health Services    I certify that patient: aMriah Winslow is under my care and that I, or a nurse practitioner or physician's assistant working with me, had a face-to-face encounter that meets the physician face-to-face encounter requirements with this patient on: 2/19/2024.    This encounter with the patient was in whole, or in part, for the following medical condition, which is the primary reason for home health care: right hip fracture, dementia.    I certify that, based on my findings, the following services are medically necessary home health services: Occupational Therapy, Physical Therapy, and Speech Language Therapy.    My clinical findings support the need for the above services because: Occupational Therapy Services are needed to assess and treat cognitive ability and address ADL safety due to impairment in ADL's, home safety., Physical Therapy Services are needed to assess and treat the following functional impairments: strengthening, endurnace, home safety., and Speech Therapy Services are needed to assess and treat impairments in language and/or swallow functions due to speech and swallowing.    Further, I certify that my clinical findings support that this patient is homebound (i.e. absences from home require considerable and taxing effort and are for medical reasons or [employer] services or infrequently or of short duration when for other reasons) because: Requires assistance of another person or specialized equipment to access medical services because patient: Requires supervision of another for safe transfer...    Based on the above findings. I certify that this  patient is confined to the home and needs intermittent skilled nursing care, physical therapy and/or speech therapy.  The patient is under my care, and I have initiated the establishment of the plan of care.  This patient will be followed by a physician who will periodically review the plan of care.  Physician/Provider to provide follow up care: Haase, Tonya Lynn    Attending [facility] physician (the Medicare certified PECOS provider): Tonya Lynn Haase, APRN CNP  Physician Signature: See electronic signature associated with these discharge orders.  Date: 2/19/2024

## 2024-02-19 NOTE — LETTER
2/19/2024        RE: Mariah Winslow  [facility]  [address]  [facility] [address]        [facility] DISCHARGE SUMMARY  PATIENT'S NAME: Mariah Winslow  YOB: 1945  MEDICAL RECORD NUMBER:  [id number]  Place of Service where encounter took place:  [facility] ([facility]) [[id number]]    PRIMARY CARE PROVIDER AND CLINIC RESPONSIBLE AFTER TRANSFER:   Tonya Lynn Haase, APRN CNP, [address] [facility] [address]    Assisted Living: [facility]       Transferring providers: Tonya Lynn Haase, APRN CNP, Memo Azevedo MD  Recent Hospitalization/ED:  [facility] stay 1/2/24 to 1/6/24.  Date of SNF Admission: January 06, 2024  Date of SNF (anticipated) Discharge: February 20, 2024  Discharged to: previous assisted living  Cognitive Scores:  non verbal  Physical Function: Ambulating 100 ft with CGA  DME: Wheelchair and Walker    CODE STATUS/ADVANCE DIRECTIVES DISCUSSION:  Full Code   ALLERGIES: Clindamycin, Guaifenesin, Penicillins, and Erythromycin base    NURSING FACILITY COURSE   Medication Changes/Rationale:   See assessment and plan    Summary of nursing facility stay:   Patient progressed to walking up to 100 feet using a RW with CGA, transfers with therapy not consistent, an EZ stand lift was ordered for tranfers at [facility], total assist with cares, will discharge to [facility] with home PT, OT, ST through [facility] therapy    Discharge Medications:  MED REC REQUIRED  Post Medication Reconciliation Status: discharge medications reconciled and changed, per note/orders       Current Outpatient Medications   Medication Sig Dispense Refill     acetaminophen (TYLENOL) 500 MG tablet Take 2 tablets (1,000 mg) by mouth 3 times daily       ammonium lactate (LAC-HYDRIN) 12 % external lotion Apply topically daily       hydrOXYzine HCl (ATARAX) 10 MG tablet Take 1 tablet (10 mg) by mouth 3 times daily as needed for other (pain  "adjunct)       loperamide (IMODIUM) 2 MG capsule Take 4 mg by mouth daily as needed for diarrhea       oxyCODONE (ROXICODONE) 5 MG tablet Take 0.5-1 tablets (2.5-5 mg) by mouth every 4 hours as needed for moderate to severe pain (Take 2.5 mg (1/2 tab) for pain 4-6/10, take 5 mg (1 tab) for pain 7-10/10) 20 tablet 0     PARoxetine (PAXIL) 20 MG tablet Take 20 mg by mouth At Bedtime       polyethylene glycol (MIRALAX) 17 GM/Dose powder Take 17 g by mouth daily       senna-docusate (SENOKOT-S/PERICOLACE) 8.6-50 MG tablet Take 1 tablet by mouth daily as needed for constipation       vitamin D3 (CHOLECALCIFEROL) 50 mcg (2000 units) tablet Take 1 tablet by mouth daily            Controlled medications:   not applicable/none     Past Medical History:   Past Medical History:   Diagnosis Date     Dementia (H)      DVT (deep venous thrombosis) (H)      Major depressive disorder, recurrent episode, moderate (H) 04/29/2014     Physical Exam:   Vitals: /60   Pulse 70   Temp 97.7  F (36.5  C)   Resp 16   Ht 1.6 m (5' 3\")   Wt 59 kg (130 lb)   LMP  (LMP Unknown)   SpO2 95%   BMI 23.03 kg/m    BMI: Body mass index is 23.03 kg/m .  GENERAL APPEARANCE:  Alert, in no distress  ENT:  Mouth and posterior oropharynx normal, moist mucous membranes, [place]  EYES:  EOM, conjunctivae, lids, pupils and irises normal, PERRL  RESP:  respiratory effort and palpation of chest normal, lungs clear to auscultation , no respiratory distress  CV:  Palpation and auscultation of heart done , regular rate and rhythm, no murmur, rub, or gallop, no edema  ABDOMEN:  normal bowel sounds, soft, nontender, no hepatosplenomegaly or other masses  M/S:   sitting up in w/c  SKIN:  Inspection of skin and subcutaneous tissue baseline  NEURO:   non verbal  PSYCH:  affect and mood normal     SNF labs: Recent labs in [facility] reviewed by me today.  and Most Recent 3 CBC's:  Recent Labs   Lab Test 01/05/24  0959 01/04/24  0719 01/03/24  0720 01/02/24  1141   WBC  " --  12.1* 9.2 15.0*   HGB 8.3* 8.4* 9.8* 10.8*   MCV  --  97 96 94   PLT  --  223 263 289     Most Recent 3 BMP's:  Recent Labs   Lab Test 01/06/24  0906 01/05/24  1904 01/04/24  0719 01/03/24  0720 01/02/24  1915 01/02/24  1141   NA  --   --  140 143  --  138   POTASSIUM  --   --  4.1 3.9  --  4.9   CHLORIDE  --   --  106 106  --  104   CO2  --   --  27 26  --  25   BUN  --   --  16.7 16.7  --  18.1   CR  --   --  0.73 0.78  --  0.68   ANIONGAP  --   --  7 11  --  9   DAYA  --   --  8.6* 8.9  --  9.3   GLC 94 114* 115* 127*   < > 142*    < > = values in this interval not displayed.       Assessment/Plan:  (S72.001D) Traumatic fracture of right hip with routine healing  (primary encounter diagnosis)  (R53.81) Physical deconditioning  Comment: acute/ongoing s/p IM nailing Dr. Pearson 1/2/24  Plan: Discharge to [facility] with home PT, OT, ST through [facility] therapy,  continue tylenol  1000mg TID scheduled and qhs prn  Discontinue atarax and oxycodone at discharge  ASA 81mg QD  F/u with ortho as directed  Ordering a w/c for home use  Ordering a sit to stand lift for transfers     (D62) Anemia due to blood loss, acute  Comment: acute on chronic  Hgb 9.0 on 1/11/24 trending up  Plan:f/u with PCP     (E44.0) Moderate protein-calorie malnutrition (H24)  Comment: acute/ongoing, no change  Plan: dietician to follow     (K59.03) Drug-induced constipation  Comment: acute/ongoing,  no change  Plan: miralax 17gm QD, senna s 1 PO QD prn     (J44.9) Chronic obstructive pulmonary disease, unspecified COPD type (H)  Comment: ongoing, no change  Plan: F/u with PCP    DISCHARGE PLAN:  Follow up labs: No labs orders/due  Medical Follow Up:      Follow up with primary care provider in 1-2 weeks  [facility] scheduled appointments:  Appointments in Next Year      Feb 19, 2024 12:30 PM  Discharge Summary with Tonya Lynn Haase, APRN CNP  [facility] ([facility] ) [phone]            Discharge Services: Home Care:  Occupational Therapy, Physical Therapy, and Speech Therapy   Discharge Instructions Verbalized to Patient at Discharge:   None    TOTAL DISCHARGE TIME:   Greater than 30 minutes  Electronically signed by:  Tonya Lynn Haase, APRN CNP     Home care Face to Face documentation done in [facility] attached to Home care orders for [facility]. , Documentation of Face to Face and Certification for Home Health Services    I certify that patient: Mariah Winslow is under my care and that I, or a nurse practitioner or physician's assistant working with me, had a face-to-face encounter that meets the physician face-to-face encounter requirements with this patient on: 2/19/2024.    This encounter with the patient was in whole, or in part, for the following medical condition, which is the primary reason for home health care: right hip fracture, dementia.    I certify that, based on my findings, the following services are medically necessary home health services: Occupational Therapy, Physical Therapy, and Speech Language Therapy.    My clinical findings support the need for the above services because: Occupational Therapy Services are needed to assess and treat cognitive ability and address ADL safety due to impairment in ADL's, home safety., Physical Therapy Services are needed to assess and treat the following functional impairments: strengthening, endurnace, home safety., and Speech Therapy Services are needed to assess and treat impairments in language and/or swallow functions due to speech and swallowing.    Further, I certify that my clinical findings support that this patient is homebound (i.e. absences from home require considerable and taxing effort and are for medical reasons or [facility] services or infrequently or of short duration when for other reasons) because: Requires assistance of another person or specialized equipment to access medical services because patient:  Requires supervision of another for safe transfer...    Based on the above findings. I certify that this patient is confined to the home and needs intermittent skilled nursing care, physical therapy and/or speech therapy.  The patient is under my care, and I have initiated the establishment of the plan of care.  This patient will be followed by a physician who will periodically review the plan of care.  Physician/Provider to provide follow up care: Haase, Tonya Lynn    Attending hospital physician (the Medicare certified PEC[facility] provider): Tonya Lynn Haase, APRN CNP  Physician Signature: See electronic signature associated with these discharge orders.  Date: 2/19/2024              Sincerely,        Tonya Lynn Haase, APRN CNP

## (undated) DEVICE — PACK HIP NAILING SOP15HNSB

## (undated) DEVICE — SOL NACL 0.9% IRRIG 1000ML BOTTLE 2F7124

## (undated) DEVICE — SOL WATER IRRIG 1000ML BOTTLE 2F7114

## (undated) DEVICE — SU VICRYL 0 CT-1 27" J340H

## (undated) DEVICE — KIT PATIENT CARE HANA TABLE PROFX SUPINE 6855

## (undated) DEVICE — DRILL BIT STRK SHORT 4.2X180MM  1806-4270S

## (undated) DEVICE — DRILL SRG 360MM LCK 4.2MM STRL

## (undated) DEVICE — DRAPE COVER C-ARM SEAMLESS SNAP-KAP 03-KP26 LATEX FREE

## (undated) DEVICE — Device

## (undated) DEVICE — DRAPE STERI U 1015

## (undated) DEVICE — GOWN IMPERVIOUS SPECIALTY XL/XLONG 39049

## (undated) DEVICE — SUCTION MANIFOLD NEPTUNE SGL

## (undated) DEVICE — SU MONOCRYL 3-0 PS-2 27" Y427H

## (undated) DEVICE — STPL SKIN 35W ROTATING HEAD PRW35

## (undated) DEVICE — LINEN TOWEL PACK X5 5464

## (undated) DEVICE — CORTICAL OPENER

## (undated) DEVICE — MANIFOLD NEPTUNE 4 PORT 700-20

## (undated) DEVICE — PREP CHLORAPREP 26ML TINTED HI-LITE ORANGE 930815

## (undated) DEVICE — DRSG TEGADERM 4X4 3/4" 1626

## (undated) DEVICE — GLOVE BIOGEL PI MICRO INDICATOR UNDERGLOVE SZ 7.5 48975

## (undated) DEVICE — SU VICRYL 2-0 CP-1 27" J466H

## (undated) DEVICE — GLOVE BIOGEL PI ULTRATOUCH SZ 7.5 41175

## (undated) DEVICE — DECANTER BAG 2002S

## (undated) DEVICE — ESU GROUND PAD UNIVERSAL W/O CORD

## (undated) RX ORDER — DEXAMETHASONE SODIUM PHOSPHATE 4 MG/ML
INJECTION, SOLUTION INTRA-ARTICULAR; INTRALESIONAL; INTRAMUSCULAR; INTRAVENOUS; SOFT TISSUE
Status: DISPENSED
Start: 2024-01-03

## (undated) RX ORDER — FENTANYL CITRATE 50 UG/ML
INJECTION, SOLUTION INTRAMUSCULAR; INTRAVENOUS
Status: DISPENSED
Start: 2024-01-03

## (undated) RX ORDER — ONDANSETRON 2 MG/ML
INJECTION INTRAMUSCULAR; INTRAVENOUS
Status: DISPENSED
Start: 2024-01-03

## (undated) RX ORDER — PROPOFOL 10 MG/ML
INJECTION, EMULSION INTRAVENOUS
Status: DISPENSED
Start: 2024-01-03